# Patient Record
Sex: MALE | Race: WHITE | NOT HISPANIC OR LATINO | Employment: OTHER | URBAN - METROPOLITAN AREA
[De-identification: names, ages, dates, MRNs, and addresses within clinical notes are randomized per-mention and may not be internally consistent; named-entity substitution may affect disease eponyms.]

---

## 2018-05-19 ENCOUNTER — APPOINTMENT (EMERGENCY)
Dept: RADIOLOGY | Facility: HOSPITAL | Age: 68
End: 2018-05-19
Payer: COMMERCIAL

## 2018-05-19 ENCOUNTER — HOSPITAL ENCOUNTER (EMERGENCY)
Facility: HOSPITAL | Age: 68
Discharge: HOME | End: 2018-05-19
Attending: EMERGENCY MEDICINE | Admitting: EMERGENCY MEDICINE
Payer: COMMERCIAL

## 2018-05-19 VITALS
HEART RATE: 70 BPM | TEMPERATURE: 98.3 F | OXYGEN SATURATION: 95 % | DIASTOLIC BLOOD PRESSURE: 65 MMHG | WEIGHT: 237.66 LBS | SYSTOLIC BLOOD PRESSURE: 135 MMHG | RESPIRATION RATE: 24 BRPM

## 2018-05-19 DIAGNOSIS — J06.9 UPPER RESPIRATORY INFECTION: Primary | ICD-10-CM

## 2018-05-19 LAB
ALBUMIN SERPL BCP-MCNC: 3.6 G/DL (ref 3.5–5)
ALP SERPL-CCNC: 75 U/L (ref 46–116)
ALT SERPL W P-5'-P-CCNC: 33 U/L (ref 12–78)
ANION GAP SERPL CALCULATED.3IONS-SCNC: 9 MMOL/L (ref 4–13)
APTT PPP: 26 SECONDS (ref 24–36)
AST SERPL W P-5'-P-CCNC: 19 U/L (ref 5–45)
BASOPHILS # BLD AUTO: 0.02 THOUSANDS/ΜL (ref 0–0.1)
BASOPHILS NFR BLD AUTO: 0 % (ref 0–1)
BILIRUB SERPL-MCNC: 0.4 MG/DL (ref 0.2–1)
BUN SERPL-MCNC: 24 MG/DL (ref 5–25)
CALCIUM SERPL-MCNC: 8.6 MG/DL (ref 8.3–10.1)
CHLORIDE SERPL-SCNC: 106 MMOL/L (ref 100–108)
CO2 SERPL-SCNC: 27 MMOL/L (ref 21–32)
CREAT SERPL-MCNC: 1.17 MG/DL (ref 0.6–1.3)
EOSINOPHIL # BLD AUTO: 0.14 THOUSAND/ΜL (ref 0–0.61)
EOSINOPHIL NFR BLD AUTO: 2 % (ref 0–6)
ERYTHROCYTE [DISTWIDTH] IN BLOOD BY AUTOMATED COUNT: 14.1 % (ref 11.6–15.1)
GFR SERPL CREATININE-BSD FRML MDRD: 64 ML/MIN/1.73SQ M
GLUCOSE SERPL-MCNC: 112 MG/DL (ref 65–140)
HCT VFR BLD AUTO: 45.2 % (ref 36.5–49.3)
HGB BLD-MCNC: 14.7 G/DL (ref 12–17)
INR PPP: 0.95 (ref 0.86–1.17)
LYMPHOCYTES # BLD AUTO: 1.29 THOUSANDS/ΜL (ref 0.6–4.47)
LYMPHOCYTES NFR BLD AUTO: 14 % (ref 14–44)
MCH RBC QN AUTO: 29 PG (ref 26.8–34.3)
MCHC RBC AUTO-ENTMCNC: 32.5 G/DL (ref 31.4–37.4)
MCV RBC AUTO: 89 FL (ref 82–98)
MONOCYTES # BLD AUTO: 0.85 THOUSAND/ΜL (ref 0.17–1.22)
MONOCYTES NFR BLD AUTO: 9 % (ref 4–12)
NEUTROPHILS # BLD AUTO: 7.27 THOUSANDS/ΜL (ref 1.85–7.62)
NEUTS SEG NFR BLD AUTO: 76 % (ref 43–75)
PLATELET # BLD AUTO: 213 THOUSANDS/UL (ref 149–390)
PMV BLD AUTO: 10.6 FL (ref 8.9–12.7)
POTASSIUM SERPL-SCNC: 4.7 MMOL/L (ref 3.5–5.3)
PROT SERPL-MCNC: 7.5 G/DL (ref 6.4–8.2)
PROTHROMBIN TIME: 12.4 SECONDS (ref 11.8–14.2)
RBC # BLD AUTO: 5.07 MILLION/UL (ref 3.88–5.62)
SODIUM SERPL-SCNC: 142 MMOL/L (ref 136–145)
TROPONIN I SERPL-MCNC: <0.02 NG/ML
WBC # BLD AUTO: 9.57 THOUSAND/UL (ref 4.31–10.16)

## 2018-05-19 PROCEDURE — 93005 ELECTROCARDIOGRAM TRACING: CPT

## 2018-05-19 PROCEDURE — 36415 COLL VENOUS BLD VENIPUNCTURE: CPT | Performed by: PHYSICIAN ASSISTANT

## 2018-05-19 PROCEDURE — 84484 ASSAY OF TROPONIN QUANT: CPT | Performed by: PHYSICIAN ASSISTANT

## 2018-05-19 PROCEDURE — 80053 COMPREHEN METABOLIC PANEL: CPT | Performed by: PHYSICIAN ASSISTANT

## 2018-05-19 PROCEDURE — 85025 COMPLETE CBC W/AUTO DIFF WBC: CPT | Performed by: PHYSICIAN ASSISTANT

## 2018-05-19 PROCEDURE — 87040 BLOOD CULTURE FOR BACTERIA: CPT | Performed by: PHYSICIAN ASSISTANT

## 2018-05-19 PROCEDURE — 99285 EMERGENCY DEPT VISIT HI MDM: CPT

## 2018-05-19 PROCEDURE — 85730 THROMBOPLASTIN TIME PARTIAL: CPT | Performed by: PHYSICIAN ASSISTANT

## 2018-05-19 PROCEDURE — 96360 HYDRATION IV INFUSION INIT: CPT

## 2018-05-19 PROCEDURE — 94640 AIRWAY INHALATION TREATMENT: CPT

## 2018-05-19 PROCEDURE — 71046 X-RAY EXAM CHEST 2 VIEWS: CPT

## 2018-05-19 PROCEDURE — 85610 PROTHROMBIN TIME: CPT | Performed by: PHYSICIAN ASSISTANT

## 2018-05-19 PROCEDURE — 96361 HYDRATE IV INFUSION ADD-ON: CPT

## 2018-05-19 RX ORDER — DIAZEPAM 5 MG/1
5 TABLET ORAL ONCE
Status: COMPLETED | OUTPATIENT
Start: 2018-05-19 | End: 2018-05-19

## 2018-05-19 RX ORDER — ALBUTEROL SULFATE 2.5 MG/3ML
2.5 SOLUTION RESPIRATORY (INHALATION) ONCE
Status: COMPLETED | OUTPATIENT
Start: 2018-05-19 | End: 2018-05-19

## 2018-05-19 RX ORDER — AZITHROMYCIN 250 MG/1
250 TABLET, FILM COATED ORAL DAILY
Qty: 4 TABLET | Refills: 0 | Status: SHIPPED | OUTPATIENT
Start: 2018-05-19 | End: 2018-05-23

## 2018-05-19 RX ORDER — AZITHROMYCIN 250 MG/1
500 TABLET, FILM COATED ORAL ONCE
Status: COMPLETED | OUTPATIENT
Start: 2018-05-19 | End: 2018-05-19

## 2018-05-19 RX ORDER — BENZONATATE 100 MG/1
100 CAPSULE ORAL 3 TIMES DAILY PRN
Qty: 20 CAPSULE | Refills: 0 | Status: SHIPPED | OUTPATIENT
Start: 2018-05-19 | End: 2018-05-26

## 2018-05-19 RX ADMIN — ALBUTEROL SULFATE 2.5 MG: 2.5 SOLUTION RESPIRATORY (INHALATION) at 11:34

## 2018-05-19 RX ADMIN — AZITHROMYCIN 500 MG: 250 TABLET, FILM COATED ORAL at 13:37

## 2018-05-19 RX ADMIN — SODIUM CHLORIDE 1000 ML: 0.9 INJECTION, SOLUTION INTRAVENOUS at 11:21

## 2018-05-19 RX ADMIN — DIAZEPAM 5 MG: 5 TABLET ORAL at 13:37

## 2018-05-19 RX ADMIN — IPRATROPIUM BROMIDE 0.5 MG: 0.5 SOLUTION RESPIRATORY (INHALATION) at 11:34

## 2018-05-19 NOTE — ED NOTES
Patient remains on continuous cardiac and oxygenation monitoring       Esteban Patel RN  05/19/18 6674

## 2018-05-19 NOTE — ED NOTES
Patient states he is anxious about being here today  Visitor at bedside admits patient is nervous about everything since the death of his wife and takes medication daily for anxiety  Patient and visitor are tearful while explaining  Patient encouraged to slow breathing and calm himself        Prakash Brand RN  05/19/18 7381

## 2018-05-19 NOTE — DISCHARGE INSTRUCTIONS
Acute Bronchitis   WHAT YOU NEED TO KNOW:   Acute bronchitis is swelling and irritation in the air passages of your lungs  This irritation may cause you to cough or have other breathing problems  Acute bronchitis often starts because of another illness, such as a cold or the flu  The illness spreads from your nose and throat to your windpipe and airways  Bronchitis is often called a chest cold  Acute bronchitis lasts about 3 to 6 weeks and is usually not a serious illness  Your cough can last for several weeks  DISCHARGE INSTRUCTIONS:   Return to the emergency department if:   · You cough up blood  · Your lips or fingernails turn blue  · You feel like you are not getting enough air when you breathe  Contact your healthcare provider if:   · You have a fever  · Your breathing problems do not go away or get worse  · Your cough does not get better within 4 weeks  · You have questions or concerns about your condition or care  Self-care:   · Get more rest   Rest helps your body to heal  Slowly start to do more each day  Rest when you feel it is needed  · Avoid irritants in the air  Avoid chemicals, fumes, and dust  Wear a face mask if you must work around dust or fumes  Stay inside on days when air pollution levels are high  If you have allergies, stay inside when pollen counts are high  Do not use aerosol products, such as spray-on deodorant, bug spray, and hair spray  · Do not smoke or be around others who smoke  Nicotine and other chemicals in cigarettes and cigars damages the cilia that move mucus out of your lungs  Ask your healthcare provider for information if you currently smoke and need help to quit  E-cigarettes or smokeless tobacco still contain nicotine  Talk to your healthcare provider before you use these products  · Drink liquids as directed  Liquids help keep your air passages moist and help you cough up mucus   You may need to drink more liquids when you have acute bronchitis  Ask how much liquid to drink each day and which liquids are best for you  · Use a humidifier or vaporizer  Use a cool mist humidifier or a vaporizer to increase air moisture in your home  This may make it easier for you to breathe and help decrease your cough  Decrease risk for acute bronchitis:   · Get the vaccinations you need  Ask your healthcare provider if you should get vaccinated against the flu or pneumonia  · Prevent the spread of germs  You can decrease your risk of acute bronchitis and other illnesses by doing the following:     [facility] your hands often with soap and water  Carry germ-killing hand lotion or gel with you  You can use the lotion or gel to clean your hands when soap and water are not available  ¨ Do not touch your eyes, nose, or mouth unless you have washed your hands first     ¨ Always cover your mouth when you cough to prevent the spread of germs  It is best to cough into a tissue or your shirt sleeve instead of into your hand  Ask those around you cover their mouths when they cough  ¨ Try to avoid people who have a cold or the flu  If you are sick, stay away from others as much as possible  Medicines: Your healthcare provider may  give you any of the following:  · Ibuprofen or acetaminophen  are medicines that help lower your fever  They are available without a doctor's order  Ask your healthcare provider which medicine is right for you  Ask how much to take and how often to take it  Follow directions  These medicines can cause stomach bleeding if not taken correctly  Ibuprofen can cause kidney damage  Do not take ibuprofen if you have kidney disease, an ulcer, or allergies to aspirin  Acetaminophen can cause liver damage  Do not take more than 4,000 milligrams in 24 hours  · Decongestants  help loosen mucus in your lungs and make it easier to cough up  This can help you breathe easier  · Cough suppressants  decrease your urge to cough   If your cough produces mucus, do not take a cough suppressant unless your healthcare provider tells you to  Your healthcare provider may suggest that you take a cough suppressant at night so you can rest     · Inhalers  may be given  Your healthcare provider may give you one or more inhalers to help you breathe easier and cough less  An inhaler gives your medicine to open your airways  Ask your healthcare provider to show you how to use your inhaler correctly  · Take your medicine as directed  Contact your healthcare provider if you think your medicine is not helping or if you have side effects  Tell him of her if you are allergic to any medicine  Keep a list of the medicines, vitamins, and herbs you take  Include the amounts, and when and why you take them  Bring the list or the pill bottles to follow-up visits  Carry your medicine list with you in case of an emergency  Follow up with your healthcare provider as directed:  Write down questions you have so you will remember to ask them during your follow-up visits  © 2017 [employer] Information is for End User's use only and may not be sold, redistributed or otherwise used for commercial purposes  All illustrations and images included in CareNotes® are the copyrighted property of [employer]  or Dalton Maldonado  The above information is an  only  It is not intended as medical advice for individual conditions or treatments  Talk to your doctor, nurse or pharmacist before following any medical regimen to see if it is safe and effective for you

## 2018-05-19 NOTE — ED PROVIDER NOTES
History  Chief Complaint   Patient presents with    Irregular Heart Beat     Referred by Urgent Care for abnormal EKG  Went to urgent care due to "feeling unwell", dx viral URI  Denies SOB, nausea  Admits chest tightness, anxious since rushed to ED  56-year-old male presents to the emergency department with complaints of upper respiratory symptoms  States that he has had a cough and feeling generally unwell over the past few days  States that he has not had any fevers at home  No shortness of breath or chest pain  Denies nausea or vomiting  States that he went to see a provider [facility] earlier today and had EKG done which was abnormal   Notes that since that time he was rushed to the emergency department after being diagnosed with a viral upper respiratory infection and has developed some anxiety along with chest tightness  History provided by:  Patient   used: No        None       Past Medical History:   Diagnosis Date    Anxiety     Depression     Hyperlipidemia     Hypertension        Past Surgical History:   Procedure Laterality Date    KNEE ARTHROSCOPY Left     SHOULDER ARTHROSCOPY Right        History reviewed  No pertinent family history  I have reviewed and agree with the history as documented  Social History   Substance Use Topics    Smoking status: Never Smoker    Smokeless tobacco: Never Used    Alcohol use No        Review of Systems   Constitutional: Negative for activity change, appetite change, chills and fever  HENT: Positive for congestion  Negative for dental problem, drooling, ear discharge, ear pain, mouth sores, nosebleeds, rhinorrhea, sore throat and trouble swallowing  Eyes: Negative for pain, discharge and itching  Respiratory: Positive for cough and shortness of breath  Negative for chest tightness and wheezing  Cardiovascular: Negative for chest pain and palpitations     Gastrointestinal: Negative for abdominal pain, blood in stool, constipation, diarrhea, nausea and vomiting  Endocrine: Negative for cold intolerance and heat intolerance  Genitourinary: Negative for difficulty urinating, dysuria, flank pain, frequency and urgency  Skin: Negative for rash and wound  Allergic/Immunologic: Negative for food allergies and immunocompromised state  Neurological: Negative for dizziness, seizures, syncope, weakness, numbness and headaches  Psychiatric/Behavioral: Negative for agitation, behavioral problems and confusion  Physical Exam  Physical Exam   Constitutional: He is oriented to person, place, and time  He appears well-developed and well-nourished  No distress  HENT:   Head: Normocephalic and atraumatic  Right Ear: External ear normal    Left Ear: External ear normal    Mouth/Throat: Oropharynx is clear and moist  No oropharyngeal exudate  Eyes: Conjunctivae are normal    Neck: No JVD present  No tracheal deviation present  Cardiovascular: Normal rate, regular rhythm and normal heart sounds  Exam reveals no gallop and no friction rub  No murmur heard  Pulmonary/Chest: Effort normal  No respiratory distress  He has wheezes (scattered)  He has no rales  He exhibits no tenderness  Abdominal: Soft  Bowel sounds are normal  He exhibits no distension  There is no tenderness  There is no guarding  Musculoskeletal: Normal range of motion  He exhibits no edema, tenderness or deformity  Lymphadenopathy:     He has no cervical adenopathy  Neurological: He is alert and oriented to person, place, and time  Skin: Skin is warm and dry  No rash noted  He is not diaphoretic  No erythema  Psychiatric: He has a normal mood and affect  His behavior is normal    Nursing note and vitals reviewed        Vital Signs  ED Triage Vitals [05/19/18 1103]   Temperature Pulse Respirations Blood Pressure SpO2   98 3 °F (36 8 °C) 66 (!) 40 134/91 95 %      Temp Source Heart Rate Source Patient Position - Orthostatic VS BP Location FiO2 (%)   Oral Monitor Sitting Right arm --      Pain Score       --           Vitals:    05/19/18 1103   BP: 134/91   Pulse: 66   Patient Position - Orthostatic VS: Sitting       Visual Acuity      ED Medications  Medications   azithromycin (ZITHROMAX) tablet 500 mg (not administered)   diazepam (VALIUM) tablet 5 mg (not administered)   sodium chloride 0 9 % bolus 1,000 mL (1,000 mL Intravenous New Bag 5/19/18 1121)   albuterol inhalation solution 2 5 mg (2 5 mg Nebulization Given 5/19/18 1134)   ipratropium (ATROVENT) 0 02 % inhalation solution 0 5 mg (0 5 mg Nebulization Given 5/19/18 1134)       Diagnostic Studies  Results Reviewed     Procedure Component Value Units Date/Time    Troponin I [[id number]]  (Normal) Collected:  05/19/18 1121    Lab Status:  Final result Specimen:  Blood from Arm, Right Updated:  05/19/18 1147     Troponin I <0 02 ng/mL     Narrative:         [employer] Chemistry analyzer 99% cutoff is > 0 04 ng/mL in network labs    o cTnI 99% cutoff is useful only when applied to patients in the clinical setting of myocardial ischemia  o cTnI 99% cutoff should be interpreted in the context of clinical history, ECG findings and possibly cardiac imaging to establish correct diagnosis  o cTnI 99% cutoff may be suggestive but clearly not indicative of a coronary event without the clinical setting of myocardial ischemia      Comprehensive metabolic panel [[id number]] Collected:  05/19/18 1121    Lab Status:  Final result Specimen:  Blood from Arm, Right Updated:  05/19/18 1146     Sodium 142 mmol/L      Potassium 4 7 mmol/L      Chloride 106 mmol/L      CO2 27 mmol/L      Anion Gap 9 mmol/L      BUN 24 mg/dL      Creatinine 1 17 mg/dL      Glucose 112 mg/dL      Calcium 8 6 mg/dL      AST 19 U/L      ALT 33 U/L      Alkaline Phosphatase 75 U/L      Total Protein 7 5 g/dL      Albumin 3 6 g/dL      Total Bilirubin 0 40 mg/dL      eGFR 64 ml/min/1 73sq m     Narrative:         National Kidney Disease Education Program recommendations are as follows:  GFR calculation is accurate only with a steady state creatinine  Chronic Kidney disease less than 60 ml/min/1 73 sq  meters  Kidney failure less than 15 ml/min/1 73 sq  meters  Protime-INR [[id number]]  (Normal) Collected:  05/19/18 1121    Lab Status:  Final result Specimen:  Blood from Arm, Right Updated:  05/19/18 1140     Protime 12 4 seconds      INR 0 95    APTT [[id number]]  (Normal) Collected:  05/19/18 1121    Lab Status:  Final result Specimen:  Blood from Arm, Right Updated:  05/19/18 1140     PTT 26 seconds     CBC and differential [[id number]]  (Abnormal) Collected:  05/19/18 1121    Lab Status:  Final result Specimen:  Blood from Arm, Right Updated:  05/19/18 1130     WBC 9 57 Thousand/uL      RBC 5 07 Million/uL      Hemoglobin 14 7 g/dL      Hematocrit 45 2 %      MCV 89 fL      MCH 29 0 pg      MCHC 32 5 g/dL      RDW 14 1 %      MPV 10 6 fL      Platelets 454 Thousands/uL      Neutrophils Relative 76 (H) %      Lymphocytes Relative 14 %      Monocytes Relative 9 %      Eosinophils Relative 2 %      Basophils Relative 0 %      Neutrophils Absolute 7 27 Thousands/µL      Lymphocytes Absolute 1 29 Thousands/µL      Monocytes Absolute 0 85 Thousand/µL      Eosinophils Absolute 0 14 Thousand/µL      Basophils Absolute 0 02 Thousands/µL     Blood culture #2 [[id number]] Collected:  05/19/18 1121    Lab Status: In process Specimen:  Blood from Arm, Right Updated:  05/19/18 1127    Blood culture #1 [[id number]] Collected:  05/19/18 1116    Lab Status: In process Specimen:  Blood from Arm, Left Updated:  05/19/18 1119                 XR chest 2 views   Final Result by Audra Almanzar MD (05/19 1135)      No acute cardiopulmonary disease              Workstation performed: [id number]                    Procedures  ECG 12 Lead Documentation  Date/Time: 5/19/2018 1:03 PM  Performed by: Lisset Rhodes  Authorized by: Italia Carbajal     Indications / Diagnosis:  Pain  ECG reviewed by me, the ED Provider: yes    Patient location:  ED  Previous ECG:     Previous ECG:  Unavailable  Interpretation:     Interpretation: abnormal    Rate:     ECG rate:  96    ECG rate assessment: tachycardic    Rhythm:     Rhythm: sinus tachycardia    Ectopy:     Ectopy: none    QRS:     QRS axis:  Normal    QRS intervals:  Normal  Conduction:     Conduction: abnormal (Sinus arrhythmia)    ST segments:     ST segments:  Normal  T waves:     T waves: normal             Phone Contacts  ED Phone Contact    ED Course                               MDM  Number of Diagnoses or Management Options  Diagnosis management comments: Differential diagnosis includes but not limited to:  Upper respiratory infection, bronchitis, pneumonia, acute coronary syndrome  Amount and/or Complexity of Data Reviewed  Clinical lab tests: ordered and reviewed  Tests in the radiology section of CPT®: ordered and reviewed  Independent visualization of images, tracings, or specimens: yes      CritCare Time    Disposition  Final diagnoses:   Upper respiratory infection     Time reflects when diagnosis was documented in both MDM as applicable and the Disposition within this note     Time User Action Codes Description Comment    5/19/2018 12:48 PM Aishwarya Harrell [J06 9] Upper respiratory infection       ED Disposition     ED Disposition Condition Comment    Discharge  Paty Flores discharge to home/self care      Condition at discharge: Stable        Follow-up Information     Follow up With Specialties Details Why [facility], DO Family Medicine   [address] [id number] [phone]            Patient's Medications   Discharge Prescriptions    AZITHROMYCIN (ZITHROMAX) 250 MG TABLET    Take 1 tablet (250 mg total) by mouth daily for 4 days       Start Date: 5/19/2018 End Date: 5/23/2018       Order Dose: 250 mg       Quantity: 4 tablet    Refills: 0    BENZONATATE (TESSALON PERLES) 100 MG CAPSULE    Take 1 capsule (100 mg total) by mouth 3 (three) times a day as needed for cough for up to 7 days       Start Date: 5/19/2018 End Date: 5/26/2018       Order Dose: 100 mg       Quantity: 20 capsule    Refills: 0     No discharge procedures on file      ED Provider  Electronically Signed by           Matt Denny PA-C  05/19/18 115 Tammy Basurto PA-C  05/19/18 2234

## 2018-05-21 LAB
ATRIAL RATE: 115 BPM
P AXIS: -77 DEGREES
PR INTERVAL: 160 MS
QRS AXIS: 9 DEGREES
QRSD INTERVAL: 92 MS
QT INTERVAL: 342 MS
QTC INTERVAL: 433 MS
T WAVE AXIS: 34 DEGREES
VENTRICULAR RATE: 96 BPM

## 2018-05-21 PROCEDURE — 93010 ELECTROCARDIOGRAM REPORT: CPT | Performed by: INTERNAL MEDICINE

## 2018-05-24 LAB
BACTERIA BLD CULT: NORMAL
BACTERIA BLD CULT: NORMAL

## 2019-08-17 ENCOUNTER — APPOINTMENT (EMERGENCY)
Dept: CT IMAGING | Facility: HOSPITAL | Age: 69
DRG: 694 | End: 2019-08-17
Payer: COMMERCIAL

## 2019-08-17 ENCOUNTER — HOSPITAL ENCOUNTER (INPATIENT)
Facility: HOSPITAL | Age: 69
LOS: 2 days | Discharge: HOME HEALTH | DRG: 694 | End: 2019-08-20
Attending: EMERGENCY MEDICINE | Admitting: INTERNAL MEDICINE
Payer: COMMERCIAL

## 2019-08-17 DIAGNOSIS — N20.1 URETEROLITHIASIS: ICD-10-CM

## 2019-08-17 DIAGNOSIS — N23 RENAL COLIC ON LEFT SIDE: Primary | ICD-10-CM

## 2019-08-17 DIAGNOSIS — N17.9 AKI (ACUTE KIDNEY INJURY) (HCC): ICD-10-CM

## 2019-08-17 PROBLEM — E78.5 HYPERLIPIDEMIA: Chronic | Status: ACTIVE | Noted: 2019-08-17

## 2019-08-17 PROBLEM — I48.0 PAROXYSMAL ATRIAL FIBRILLATION (HCC): Chronic | Status: ACTIVE | Noted: 2019-08-17

## 2019-08-17 PROBLEM — K21.9 GERD (GASTROESOPHAGEAL REFLUX DISEASE): Chronic | Status: ACTIVE | Noted: 2019-08-17

## 2019-08-17 PROBLEM — I10 ESSENTIAL HYPERTENSION: Chronic | Status: ACTIVE | Noted: 2019-08-17

## 2019-08-17 PROBLEM — F32.A DEPRESSION: Chronic | Status: ACTIVE | Noted: 2019-08-17

## 2019-08-17 LAB
ALBUMIN SERPL BCP-MCNC: 3.5 G/DL (ref 3.5–5)
ALP SERPL-CCNC: 70 U/L (ref 46–116)
ALT SERPL W P-5'-P-CCNC: 24 U/L (ref 12–78)
AMORPH URATE CRY URNS QL MICRO: ABNORMAL /HPF
ANION GAP SERPL CALCULATED.3IONS-SCNC: 10 MMOL/L (ref 4–13)
AST SERPL W P-5'-P-CCNC: 18 U/L (ref 5–45)
BACTERIA UR QL AUTO: ABNORMAL /HPF
BASOPHILS # BLD AUTO: 0.04 THOUSANDS/ΜL (ref 0–0.1)
BASOPHILS NFR BLD AUTO: 0 % (ref 0–1)
BILIRUB SERPL-MCNC: 0.4 MG/DL (ref 0.2–1)
BILIRUB UR QL STRIP: NEGATIVE
BUN SERPL-MCNC: 26 MG/DL (ref 5–25)
CALCIUM SERPL-MCNC: 9.1 MG/DL (ref 8.3–10.1)
CHLORIDE SERPL-SCNC: 102 MMOL/L (ref 100–108)
CK SERPL-CCNC: 75 U/L (ref 39–308)
CLARITY UR: CLEAR
CO2 SERPL-SCNC: 27 MMOL/L (ref 21–32)
COLOR UR: YELLOW
CREAT SERPL-MCNC: 1.45 MG/DL (ref 0.6–1.3)
EOSINOPHIL # BLD AUTO: 0.19 THOUSAND/ΜL (ref 0–0.61)
EOSINOPHIL NFR BLD AUTO: 2 % (ref 0–6)
ERYTHROCYTE [DISTWIDTH] IN BLOOD BY AUTOMATED COUNT: 13.7 % (ref 11.6–15.1)
GFR SERPL CREATININE-BSD FRML MDRD: 49 ML/MIN/1.73SQ M
GLUCOSE SERPL-MCNC: 118 MG/DL (ref 65–140)
GLUCOSE UR STRIP-MCNC: NEGATIVE MG/DL
HCT VFR BLD AUTO: 45.2 % (ref 36.5–49.3)
HGB BLD-MCNC: 14.7 G/DL (ref 12–17)
HGB UR QL STRIP.AUTO: ABNORMAL
IMM GRANULOCYTES # BLD AUTO: 0.12 THOUSAND/UL (ref 0–0.2)
IMM GRANULOCYTES NFR BLD AUTO: 1 % (ref 0–2)
KETONES UR STRIP-MCNC: ABNORMAL MG/DL
LEUKOCYTE ESTERASE UR QL STRIP: NEGATIVE
LIPASE SERPL-CCNC: 105 U/L (ref 73–393)
LYMPHOCYTES # BLD AUTO: 1.74 THOUSANDS/ΜL (ref 0.6–4.47)
LYMPHOCYTES NFR BLD AUTO: 14 % (ref 14–44)
MCH RBC QN AUTO: 29.6 PG (ref 26.8–34.3)
MCHC RBC AUTO-ENTMCNC: 32.5 G/DL (ref 31.4–37.4)
MCV RBC AUTO: 91 FL (ref 82–98)
MONOCYTES # BLD AUTO: 1.27 THOUSAND/ΜL (ref 0.17–1.22)
MONOCYTES NFR BLD AUTO: 10 % (ref 4–12)
NEUTROPHILS # BLD AUTO: 9.45 THOUSANDS/ΜL (ref 1.85–7.62)
NEUTS SEG NFR BLD AUTO: 73 % (ref 43–75)
NITRITE UR QL STRIP: NEGATIVE
NON-SQ EPI CELLS URNS QL MICRO: ABNORMAL /HPF
NRBC BLD AUTO-RTO: 0 /100 WBCS
PH UR STRIP.AUTO: 5.5 [PH] (ref 4.5–8)
PLATELET # BLD AUTO: 257 THOUSANDS/UL (ref 149–390)
PMV BLD AUTO: 9.9 FL (ref 8.9–12.7)
POTASSIUM SERPL-SCNC: 4.5 MMOL/L (ref 3.5–5.3)
PROT SERPL-MCNC: 7.2 G/DL (ref 6.4–8.2)
PROT UR STRIP-MCNC: ABNORMAL MG/DL
RBC # BLD AUTO: 4.97 MILLION/UL (ref 3.88–5.62)
RBC #/AREA URNS AUTO: ABNORMAL /HPF
SODIUM SERPL-SCNC: 139 MMOL/L (ref 136–145)
SP GR UR STRIP.AUTO: >=1.03 (ref 1–1.03)
UROBILINOGEN UR QL STRIP.AUTO: 0.2 E.U./DL
WBC # BLD AUTO: 12.81 THOUSAND/UL (ref 4.31–10.16)
WBC #/AREA URNS AUTO: ABNORMAL /HPF

## 2019-08-17 PROCEDURE — 36415 COLL VENOUS BLD VENIPUNCTURE: CPT | Performed by: EMERGENCY MEDICINE

## 2019-08-17 PROCEDURE — 81001 URINALYSIS AUTO W/SCOPE: CPT

## 2019-08-17 PROCEDURE — 96361 HYDRATE IV INFUSION ADD-ON: CPT

## 2019-08-17 PROCEDURE — 87086 URINE CULTURE/COLONY COUNT: CPT | Performed by: EMERGENCY MEDICINE

## 2019-08-17 PROCEDURE — 96374 THER/PROPH/DIAG INJ IV PUSH: CPT

## 2019-08-17 PROCEDURE — 99220 PR INITIAL OBSERVATION CARE/DAY 70 MINUTES: CPT | Performed by: INTERNAL MEDICINE

## 2019-08-17 PROCEDURE — 85025 COMPLETE CBC W/AUTO DIFF WBC: CPT | Performed by: EMERGENCY MEDICINE

## 2019-08-17 PROCEDURE — 99285 EMERGENCY DEPT VISIT HI MDM: CPT | Performed by: EMERGENCY MEDICINE

## 2019-08-17 PROCEDURE — 80053 COMPREHEN METABOLIC PANEL: CPT | Performed by: EMERGENCY MEDICINE

## 2019-08-17 PROCEDURE — 87081 CULTURE SCREEN ONLY: CPT | Performed by: PHYSICIAN ASSISTANT

## 2019-08-17 PROCEDURE — 83690 ASSAY OF LIPASE: CPT | Performed by: EMERGENCY MEDICINE

## 2019-08-17 PROCEDURE — 74176 CT ABD & PELVIS W/O CONTRAST: CPT

## 2019-08-17 PROCEDURE — 99285 EMERGENCY DEPT VISIT HI MDM: CPT

## 2019-08-17 PROCEDURE — 82550 ASSAY OF CK (CPK): CPT | Performed by: EMERGENCY MEDICINE

## 2019-08-17 RX ORDER — TAMSULOSIN HYDROCHLORIDE 0.4 MG/1
0.4 CAPSULE ORAL
Status: DISCONTINUED | OUTPATIENT
Start: 2019-08-17 | End: 2019-08-19

## 2019-08-17 RX ORDER — HYDROMORPHONE HCL/PF 1 MG/ML
0.5 SYRINGE (ML) INJECTION EVERY 4 HOURS PRN
Status: DISCONTINUED | OUTPATIENT
Start: 2019-08-17 | End: 2019-08-20 | Stop reason: HOSPADM

## 2019-08-17 RX ORDER — METOPROLOL SUCCINATE 25 MG/1
25 TABLET, EXTENDED RELEASE ORAL
Status: DISCONTINUED | OUTPATIENT
Start: 2019-08-17 | End: 2019-08-20 | Stop reason: HOSPADM

## 2019-08-17 RX ORDER — ACETAMINOPHEN 500 MG
1000 TABLET ORAL EVERY 6 HOURS PRN
COMMUNITY

## 2019-08-17 RX ORDER — HYDROMORPHONE HCL/PF 1 MG/ML
0.2 SYRINGE (ML) INJECTION EVERY 4 HOURS PRN
Status: DISCONTINUED | OUTPATIENT
Start: 2019-08-17 | End: 2019-08-20 | Stop reason: HOSPADM

## 2019-08-17 RX ORDER — FAMOTIDINE 20 MG/1
20 TABLET, FILM COATED ORAL 2 TIMES DAILY
COMMUNITY
End: 2021-01-13 | Stop reason: SDUPTHER

## 2019-08-17 RX ORDER — SODIUM CHLORIDE 9 MG/ML
75 INJECTION, SOLUTION INTRAVENOUS CONTINUOUS
Status: DISCONTINUED | OUTPATIENT
Start: 2019-08-17 | End: 2019-08-20

## 2019-08-17 RX ORDER — MELATONIN
2000 DAILY
Status: DISCONTINUED | OUTPATIENT
Start: 2019-08-17 | End: 2019-08-20 | Stop reason: HOSPADM

## 2019-08-17 RX ORDER — FLUOXETINE HYDROCHLORIDE 20 MG/1
40 CAPSULE ORAL DAILY
Status: DISCONTINUED | OUTPATIENT
Start: 2019-08-17 | End: 2019-08-20 | Stop reason: HOSPADM

## 2019-08-17 RX ORDER — AMLODIPINE BESYLATE 5 MG/1
5 TABLET ORAL DAILY
COMMUNITY
Start: 2016-12-29 | End: 2020-07-25

## 2019-08-17 RX ORDER — PRAVASTATIN SODIUM 40 MG
40 TABLET ORAL
Status: DISCONTINUED | OUTPATIENT
Start: 2019-08-17 | End: 2019-08-20 | Stop reason: HOSPADM

## 2019-08-17 RX ORDER — FAMOTIDINE 20 MG/1
10 TABLET, FILM COATED ORAL DAILY
Status: DISCONTINUED | OUTPATIENT
Start: 2019-08-17 | End: 2019-08-20 | Stop reason: HOSPADM

## 2019-08-17 RX ORDER — HYDRALAZINE HYDROCHLORIDE 20 MG/ML
5 INJECTION INTRAMUSCULAR; INTRAVENOUS EVERY 6 HOURS PRN
Status: DISCONTINUED | OUTPATIENT
Start: 2019-08-17 | End: 2019-08-20 | Stop reason: HOSPADM

## 2019-08-17 RX ORDER — PRAVASTATIN SODIUM 20 MG
40 TABLET ORAL DAILY
COMMUNITY
End: 2020-06-17 | Stop reason: SDUPTHER

## 2019-08-17 RX ORDER — DABIGATRAN ETEXILATE 150 MG/1
150 CAPSULE, COATED PELLETS ORAL 2 TIMES DAILY
COMMUNITY
End: 2020-06-17 | Stop reason: ALTCHOICE

## 2019-08-17 RX ORDER — ONDANSETRON 2 MG/ML
4 INJECTION INTRAMUSCULAR; INTRAVENOUS EVERY 6 HOURS PRN
Status: DISCONTINUED | OUTPATIENT
Start: 2019-08-17 | End: 2019-08-20 | Stop reason: HOSPADM

## 2019-08-17 RX ORDER — KETOROLAC TROMETHAMINE 30 MG/ML
15 INJECTION, SOLUTION INTRAMUSCULAR; INTRAVENOUS ONCE
Status: COMPLETED | OUTPATIENT
Start: 2019-08-17 | End: 2019-08-17

## 2019-08-17 RX ORDER — ACETAMINOPHEN 325 MG/1
650 TABLET ORAL EVERY 6 HOURS PRN
Status: DISCONTINUED | OUTPATIENT
Start: 2019-08-17 | End: 2019-08-17

## 2019-08-17 RX ORDER — METOPROLOL SUCCINATE 25 MG/1
25 TABLET, EXTENDED RELEASE ORAL DAILY
COMMUNITY
End: 2020-08-10 | Stop reason: SDUPTHER

## 2019-08-17 RX ORDER — TAMSULOSIN HYDROCHLORIDE 0.4 MG/1
0.4 CAPSULE ORAL ONCE
Status: COMPLETED | OUTPATIENT
Start: 2019-08-17 | End: 2019-08-17

## 2019-08-17 RX ORDER — DABIGATRAN ETEXILATE 150 MG/1
150 CAPSULE, COATED PELLETS ORAL 2 TIMES DAILY
Status: DISCONTINUED | OUTPATIENT
Start: 2019-08-17 | End: 2019-08-20 | Stop reason: HOSPADM

## 2019-08-17 RX ORDER — ACETAMINOPHEN 325 MG/1
650 TABLET ORAL EVERY 6 HOURS PRN
Status: DISCONTINUED | OUTPATIENT
Start: 2019-08-17 | End: 2019-08-20 | Stop reason: HOSPADM

## 2019-08-17 RX ORDER — FLUOXETINE HYDROCHLORIDE 40 MG/1
40 CAPSULE ORAL DAILY
COMMUNITY
Start: 2016-12-29 | End: 2020-08-10 | Stop reason: SDUPTHER

## 2019-08-17 RX ADMIN — KETOROLAC TROMETHAMINE 15 MG: 30 INJECTION, SOLUTION INTRAMUSCULAR at 03:59

## 2019-08-17 RX ADMIN — DABIGATRAN ETEXILATE MESYLATE 150 MG: 150 CAPSULE ORAL at 17:01

## 2019-08-17 RX ADMIN — PRAVASTATIN SODIUM 40 MG: 40 TABLET ORAL at 17:01

## 2019-08-17 RX ADMIN — SODIUM CHLORIDE 1000 ML: 0.9 INJECTION, SOLUTION INTRAVENOUS at 03:59

## 2019-08-17 RX ADMIN — DABIGATRAN ETEXILATE MESYLATE 150 MG: 150 CAPSULE ORAL at 08:06

## 2019-08-17 RX ADMIN — VITAMIN D, TAB 1000IU (100/BT) 2000 UNITS: 25 TAB at 08:06

## 2019-08-17 RX ADMIN — FAMOTIDINE 10 MG: 20 TABLET ORAL at 08:07

## 2019-08-17 RX ADMIN — METOPROLOL SUCCINATE 25 MG: 25 TABLET, EXTENDED RELEASE ORAL at 17:01

## 2019-08-17 RX ADMIN — TAMSULOSIN HYDROCHLORIDE 0.4 MG: 0.4 CAPSULE ORAL at 17:01

## 2019-08-17 RX ADMIN — ACETAMINOPHEN 650 MG: 325 TABLET, FILM COATED ORAL at 08:54

## 2019-08-17 RX ADMIN — FLUOXETINE HYDROCHLORIDE 40 MG: 20 CAPSULE ORAL at 08:06

## 2019-08-17 RX ADMIN — TAMSULOSIN HYDROCHLORIDE 0.4 MG: 0.4 CAPSULE ORAL at 05:31

## 2019-08-17 RX ADMIN — SODIUM CHLORIDE 125 ML/HR: 0.9 INJECTION, SOLUTION INTRAVENOUS at 05:34

## 2019-08-17 NOTE — UTILIZATION REVIEW
Initial Clinical Review    Admission: Date/Time/Statement: 8/17/2019  0539 OBSERVATION     Orders Placed This Encounter   Procedures    Place in Observation     Standing Status:   Standing     Number of Occurrences:   1     Order Specific Question:   Admitting Physician     Answer:   Maite Loza [[id number]]     Order Specific Question:   Level of Care     Answer:   Med Surg [[id number]]     ED Arrival Information     Expected Arrival Acuity Means of Arrival Escorted By Service Admission Type    - 8/17/2019 03:33 Urgent Ambulance [facility] Hospitalist Urgent    Arrival Complaint    Abdominal pain        Chief Complaint   Patient presents with    Flank Pain     PT  complains of sudden onset of left sided flank pain that started in the back and radiated forward, denies any urinary complaints  Assessment/Plan:  this is a 77year old male from home to ED via EMS admitted to observation due to ureterolithiasis/RICHIE  Presented with sudden left flank pain since midnight  On exam has L CVAT  Bun 26, creatinine 1 45 with baseline of 1  Has leukocytosis of 12 81  Ct abdomen showed moderate hydroureteronephrosis up to level of a 2 mm calculus in distal ureter  Urine culture sent     Today will try medical expulsive therapy and if stone not passed, will consider urology consult     IVF, pain control, flomax  in progress    ED Triage Vitals [08/17/19 0336]   Temperature Pulse Respirations Blood Pressure SpO2   97 8 °F (36 6 °C) 78 18 (!) 199/92 95 %      Temp Source Heart Rate Source Patient Position - Orthostatic VS BP Location FiO2 (%)   Oral Monitor Sitting Right arm --      Pain Score       Worst Possible Pain        Wt Readings from Last 1 Encounters:   08/17/19 96 9 kg (213 lb 10 oz)     Additional Vital Signs:   08/17/19 0600  98 5 °F (36 9 °C)  70  18  158/76  94 %  None (Room air)  Lying   08/17/19 0535    66  18  179/93Abnormal   94 %  None (Room air)         Pertinent Labs/Diagnostic Test Results:   8/17/2019  Ct renal stone study - Moderately obstructing 2 mm distal left ureteric calculus, above the ureterovesical junction    2   Moderate-sized hiatal hernia  Results from last 7 days   Lab Units 08/17/19  0350   WBC Thousand/uL 12 81*   HEMOGLOBIN g/dL 14 7   HEMATOCRIT % 45 2   PLATELETS Thousands/uL 257   NEUTROS ABS Thousands/µL 9 45*         Results from last 7 days   Lab Units 08/17/19  0350   SODIUM mmol/L 139   POTASSIUM mmol/L 4 5   CHLORIDE mmol/L 102   CO2 mmol/L 27   ANION GAP mmol/L 10   BUN mg/dL 26*   CREATININE mg/dL 1 45*   EGFR ml/min/1 73sq m 49   CALCIUM mg/dL 9 1     Results from last 7 days   Lab Units 08/17/19  0350   AST U/L 18   ALT U/L 24   ALK PHOS U/L 70   TOTAL PROTEIN g/dL 7 2   ALBUMIN g/dL 3 5   TOTAL BILIRUBIN mg/dL 0 40     Results from last 7 days   Lab Units 08/17/19  0350   GLUCOSE RANDOM mg/dL 118     Results from last 7 days   Lab Units 08/17/19  0350   CK TOTAL U/L 75     Results from last 7 days   Lab Units 08/17/19  0350   LIPASE u/L 105     Results from last 7 days   Lab Units 08/17/19  0406   CLARITY UA  Clear   COLOR UA  Yellow   SPEC GRAV UA  >=1 030   PH UA  5 5   GLUCOSE UA mg/dl Negative   KETONES UA mg/dl Trace*   BLOOD UA  Large*   PROTEIN UA mg/dl 100 (2+)*   NITRITE UA  Negative   BILIRUBIN UA  Negative   UROBILINOGEN UA E U /dl 0 2   LEUKOCYTES UA  Negative   WBC UA /hpf 0-1*   RBC UA /hpf Innumerable*   BACTERIA UA /hpf Moderate*   EPITHELIAL CELLS WET PREP /hpf None Seen     ED Treatment:   Medication Administration from 08/17/2019 0333 to 08/17/2019 0550       Date/Time Order Dose Route Action Comments     08/17/2019 0359 sodium chloride 0 9 % bolus 1,000 mL 1,000 mL Intravenous New Bag      08/17/2019 0534 sodium chloride 0 9 % infusion 125 mL/hr Intravenous New Bag      08/17/2019 0359 ketorolac (TORADOL) injection 15 mg 15 mg Intravenous Given      08/17/2019 0531 tamsulosin (FLOMAX) capsule 0 4 mg 0 4 mg Oral Given         Past Medical History:   Diagnosis Date    Anxiety     Depression     Hyperlipidemia     Hypertension      Present on Admission:   Ureterolithiasis   Hyperlipidemia   Essential hypertension   GERD (gastroesophageal reflux disease)   [facility]   Depression   Paroxysmal atrial fibrillation (HCC)      Admitting Diagnosis: Abdominal pain [H49 6]  Renal colic on left side [F53]  [facility]IE [facility]) ([facility] ) [N17 9]  Age/Sex: 77 y o  male  Admission Orders:  8/17/2019  0539 OBSERVATION     Current Facility-Administered Medications:  Acetaminophen - used x 1 650 mg Oral Q6H PRN    cholecalciferol 2,000 Units Oral Daily    dabigatran etexilate 150 mg Oral BID    famotidine 10 mg Oral Daily    FLUoxetine 40 mg Oral Daily    hydrALAZINE 5 mg Intravenous Q6H PRN    HYDROmorphone 0 2 mg Intravenous Q4H PRN    HYDROmorphone 0 5 mg Intravenous Q4H PRN    metoprolol succinate 25 mg Oral Daily With Dinner    ondansetron 4 mg Intravenous Q6H PRN    pravastatin 40 mg Oral Daily With Dinner    sodium chloride 125 mL/hr Intravenous Continuous Last Rate: 125 mL/hr (08/17/19 0534)   tamsulosin 0 4 mg Oral Daily With [address] Utilization Review Department  Phone: [phone]; Fax [phone]  [email or web address]  ATTENTION: Please call with any questions or concerns to [phone]  and carefully listen to the prompts so that you are directed to the right person  Send all requests for admission clinical reviews, approved or denied determinations and any other requests to fax [phone]   All voicemails are confidential

## 2019-08-17 NOTE — ASSESSMENT & PLAN NOTE
· Present upon admission, creatinine is 1 45  His baseline appears to room 1 0   · This is post renal setting of obstructive uropathy  · IV fluids, monitor for passage of stone  · Monitor BMP daily

## 2019-08-17 NOTE — ED PROVIDER NOTES
History  Chief Complaint   Patient presents with    Flank Pain     PT  complains of sudden onset of left sided flank pain that started in the back and radiated forward, denies any urinary complaints  Patient is a 77year old male with an acute onset of left flank pain with radiation to left abdomen since midnight tonight  No prior episodes  No fever  No N/V/D  No urinary sx  No abdominal surgery  No rash  Was last seen in this ED on 5/19/18 for URI  [facility] website checked on this patient and last Rx filled was on 12/14/18 for vicodin for 5 day supply  History provided by:  Patient and EMS personnel   used: No    Flank Pain   Associated symptoms: no diarrhea, no fever, no nausea and no vomiting        Prior to Admission Medications   Prescriptions Last Dose Informant Patient Reported? Taking? Cholecalciferol 2000 units CAPS   Yes Yes   Sig: Take 5,000 Units by mouth daily   FLUoxetine (PROzac) 40 MG capsule   Yes Yes   Sig: Take 40 mg by mouth daily   acetaminophen (TYLENOL) 500 mg tablet   Yes Yes   Sig: Take 1,000 mg by mouth every 6 (six) hours as needed   amLODIPine (NORVASC) 5 mg tablet   Yes Yes   Sig: Take 5 mg by mouth daily   dabigatran etexilate (PRADAXA) 150 mg capsu   Yes Yes   Sig: Take 150 mg by mouth 2 (two) times a day   famotidine (PEPCID) 20 mg tablet   Yes Yes   Sig: Take 20 mg by mouth 2 (two) times a day   metoprolol succinate (TOPROL-XL) 25 mg 24 hr tablet   Yes Yes   Sig: Take 25 mg by mouth daily   pravastatin (PRAVACHOL) 20 mg tablet   Yes Yes   Sig: Take 40 mg by mouth daily      Facility-Administered Medications: None       Past Medical History:   Diagnosis Date    Anxiety     Depression     Hyperlipidemia     Hypertension        Past Surgical History:   Procedure Laterality Date    KNEE ARTHROSCOPY Left     SHOULDER ARTHROSCOPY Right        History reviewed  No pertinent family history    I have reviewed and agree with the history as documented  Social History     Tobacco Use    Smoking status: Never Smoker    Smokeless tobacco: Never Used   Substance Use Topics    Alcohol use: No    Drug use: No        Review of Systems   Constitutional: Negative for fever  Gastrointestinal: Positive for abdominal pain  Negative for diarrhea, nausea and vomiting  Genitourinary: Positive for flank pain  Negative for difficulty urinating  Skin: Negative for rash  All other systems reviewed and are negative  Physical Exam  Physical Exam   Constitutional: He is oriented to person, place, and time  He appears well-developed and well-nourished  He appears distressed (moderate)  HENT:   Head: Normocephalic and atraumatic  Mucous membranes somewhat moist     Eyes: No scleral icterus  Neck: No JVD present  No tracheal deviation present  Cardiovascular: Normal rate, regular rhythm and normal heart sounds  No murmur heard  Pulmonary/Chest: Effort normal and breath sounds normal  No respiratory distress  Abdominal: Soft  Bowel sounds are normal  He exhibits no distension  There is no tenderness  There is no rebound and no guarding  L CVAT  Musculoskeletal: He exhibits no edema or deformity  Neurological: He is alert and oriented to person, place, and time  Skin: Skin is warm and dry  No rash noted  Psychiatric: He has a normal mood and affect  Nursing note and vitals reviewed        Vital Signs  ED Triage Vitals [08/17/19 0336]   Temperature Pulse Respirations Blood Pressure SpO2   97 8 °F (36 6 °C) 78 18 (!) 199/92 95 %      Temp Source Heart Rate Source Patient Position - Orthostatic VS BP Location FiO2 (%)   Oral Monitor Sitting Right arm --      Pain Score       Worst Possible Pain           Vitals:    08/17/19 0336 08/17/19 0350 08/17/19 0535   BP: (!) 199/92 (!) 174/93 (!) 179/93   Pulse: 78 74 66   Patient Position - Orthostatic VS: Sitting Sitting Sitting         Visual Acuity      ED Medications  Medications   sodium chloride 0 9 % infusion (125 mL/hr Intravenous New Bag 8/17/19 0534)   sodium chloride 0 9 % bolus 1,000 mL (0 mL Intravenous Stopped 8/17/19 0524)   ketorolac (TORADOL) injection 15 mg (15 mg Intravenous Given 8/17/19 0359)   tamsulosin (FLOMAX) capsule 0 4 mg (0 4 mg Oral Given 8/17/19 0531)       Diagnostic Studies  Results Reviewed     Procedure Component Value Units Date/Time    Urine culture [[id number]] Collected:  08/17/19 0355    Lab Status:   In process Specimen:  Urine, Clean Catch Updated:  08/17/19 0419    Comprehensive metabolic panel [[id number]]  (Abnormal) Collected:  08/17/19 0350    Lab Status:  Final result Specimen:  Blood from Arm, Left Updated:  08/17/19 0409     Sodium 139 mmol/L      Potassium 4 5 mmol/L      Chloride 102 mmol/L      CO2 27 mmol/L      ANION GAP 10 mmol/L      BUN 26 mg/dL      Creatinine 1 45 mg/dL      Glucose 118 mg/dL      Calcium 9 1 mg/dL      AST 18 U/L      ALT 24 U/L      Alkaline Phosphatase 70 U/L      Total Protein 7 2 g/dL      Albumin 3 5 g/dL      Total Bilirubin 0 40 mg/dL      eGFR 49 ml/min/1 73sq m     Narrative:       [place] guidelines for Chronic Kidney Disease (CKD):     Stage 1 with normal or high GFR (GFR > 90 mL/min/1 73 square meters)    Stage 2 Mild CKD (GFR = 60-89 mL/min/1 73 square meters)    Stage 3A Moderate CKD (GFR = 45-59 mL/min/1 73 square meters)    Stage 3B Moderate CKD (GFR = 30-44 mL/min/1 73 square meters)    Stage 4 Severe CKD (GFR = 15-29 mL/min/1 73 square meters)    Stage 5 End Stage CKD (GFR <15 mL/min/1 73 square meters)  Note: GFR calculation is accurate only with a steady state creatinine    Lipase [[id number]]  (Normal) Collected:  08/17/19 0350    Lab Status:  Final result Specimen:  Blood from Arm, Left Updated:  08/17/19 0409     Lipase 105 u/L     CK Total with Reflex CKMB [[id number]]  (Normal) Collected:  08/17/19 0350    Lab Status:  Final result Specimen:  Blood from Arm, Left Updated: 08/17/19 0409     Total CK 75 U/L     Urine Microscopic [[id number]]  (Abnormal) Collected:  08/17/19 0406    Lab Status:  Final result Specimen:  Urine Updated:  08/17/19 0408     RBC, UA Innumerable /hpf      WBC, UA 0-1 /hpf      Epithelial Cells None Seen /hpf      Bacteria, UA Moderate /hpf      AMORPH URATES Occasional /hpf     CBC and differential [[id number]]  (Abnormal) Collected:  08/17/19 0350    Lab Status:  Final result Specimen:  Blood from Arm, Left Updated:  08/17/19 0358     WBC 12 81 Thousand/uL      RBC 4 97 Million/uL      Hemoglobin 14 7 g/dL      Hematocrit 45 2 %      MCV 91 fL      MCH 29 6 pg      MCHC 32 5 g/dL      RDW 13 7 %      MPV 9 9 fL      Platelets 571 Thousands/uL      nRBC 0 /100 WBCs      Neutrophils Relative 73 %      Immat GRANS % 1 %      Lymphocytes Relative 14 %      Monocytes Relative 10 %      Eosinophils Relative 2 %      Basophils Relative 0 %      Neutrophils Absolute 9 45 Thousands/µL      Immature Grans Absolute 0 12 Thousand/uL      Lymphocytes Absolute 1 74 Thousands/µL      Monocytes Absolute 1 27 Thousand/µL      Eosinophils Absolute 0 19 Thousand/µL      Basophils Absolute 0 04 Thousands/µL     ED Urine Macroscopic [[id number]]  (Abnormal) Collected:  08/17/19 0406    Lab Status:  Final result Specimen:  Urine Updated:  08/17/19 0355     Color, UA Yellow     Clarity, UA Clear     pH, UA 5 5     Leukocytes, UA Negative     Nitrite, UA Negative     Protein,  (2+) mg/dl      Glucose, UA Negative mg/dl      Ketones, UA Trace mg/dl      Urobilinogen, UA 0 2 E U /dl      Bilirubin, UA Negative     Blood, UA Large     Specific Gravity, UA >=1 030    Narrative:       [facility] RESULT                 CT renal stone study abdomen pelvis without contrast   ED Interpretation by Elroy Rahman MD (08/17 0520)   FINDINGS:      RIGHT KIDNEY AND URETER:   No urinary tract calculi   No hydronephrosis or hydroureter         Bifid collecting system           LEFT KIDNEY AND URETER:   There is moderate hydroureteronephrosis, up to the level of a 2 mm calculus in the distal ureter, above the ureterovesical junction   This is located at the level of the ischial tuberosities (series 2, image 130 and series 601, image 110)   There is    moderate perinephric and periureteric inflammation, up to the level of the calculus   Small amount of perinephric fluid adjacent to the lower pole   No focal collection to suggest an abscess  No additional renal or ureteric calculi are seen  URINARY BLADDER:    Unremarkable  The prostate gland is surgically absent  There is a small umbilical hernia containing fat   There are bilateral inguinal hernias containing fat, left side larger than right  Emphysematous changes are present in the lower lobes of the lungs bilaterally   There is focal eventration of the right hemidiaphragm  Limited low radiation dose noncontrast CT evaluation demonstrates no clinically significant abnormality of the spleen or pancreas  Fatty infiltrative changes are present in the liver  There is a nodular configuration of the adrenal glands   Low-density bilateral adrenal lesions, most compatible with benign adrenal adenomas, requiring no further interrogation  No calcified gallstones or gallbladder wall thickening noted  No ascites or bulky lymphadenopathy on this limited noncontrast study  Evaluation of the GI tract is limited due to lack of oral contrast material       The stomach is decompressed  Isha Betters is a hiatal hernia  There is no evidence of small bowel obstruction  Moderate amount of feces in the colon, compatible with constipation  Limited evaluation demonstrates no evidence to suggest acute appendicitis  No acute fracture or destructive osseous lesion is identified  Degenerative changes in the lumbar spine and bilateral hips     Impression:     1   Moderately obstructing 2 mm distal left ureteric calculus, above the ureterovesical junction  2   Moderate-sized hiatal hernia  The study was marked in [facility] for immediate notification  Workstation performed: [id number]         Final Result by Josephine Calderon DO (08/17 0515)   1  Moderately obstructing 2 mm distal left ureteric calculus, above the ureterovesical junction  2   Moderate-sized hiatal hernia  The study was marked in [facility] for immediate notification  Workstation performed: [id number]                    Procedures  Procedures       ED Course  ED Course as of Aug 17 0540   Sat Aug 17, 2019   0451 Pain improved  Labs d/w patient and sister and friend with patient's permission  0504 CT d/w patient and sister and friend  MDM  Number of Diagnoses or Management Options  Diagnosis management comments: DDx including but not limited to: renal colic, pyelonephritis, UTI, GI etiology, appendicitis, diverticulitis, pancreatitis, cholecystitis, biliary colic, doubt AAA, rhabdomyolysis, tumor, zoster          Amount and/or Complexity of Data Reviewed  Clinical lab tests: ordered and reviewed  Tests in the radiology section of CPT®: ordered and reviewed  Decide to obtain previous medical records or to obtain history from someone other than the patient: yes  Review and summarize past medical records: yes  Independent visualization of images, tracings, or specimens: yes        Disposition  Final diagnoses:   Renal colic on left side   RICHIE (acute kidney injury) ([facility] [address] )     Time reflects when diagnosis was documented in both MDM as applicable and the Disposition within this note     Time User Action Codes Description Comment    8/17/2019  5:39 AM Ely Mooring Add [V89] Renal colic on left side     8/17/2019  5:39 AM Ely Mooring Add [N17 9] RICHIE (acute kidney injury) [facility])       ED Disposition     ED Disposition Condition Date/Time Comment    Admit Stable Sat Aug 17, 2019 5:39 AM Case was discussed with LINNETTE Lauren and the patient's admission status was agreed to be Admission Status: observation status to the service of Dr Murali Carr   Follow-up Information    None         Patient's Medications   Discharge Prescriptions    No medications on file     No discharge procedures on file      ED Provider  Electronically Signed by           Quin Schaumann, MD  08/17/19 6637

## 2019-08-17 NOTE — H&P
H&P- Yael Eugene 1950, 77 y o  male MRN: [id number]  Unit/Bed#: -01 Encounter: [id number]  Primary Care Provider: Whitney Harris MD   Date and time admitted to hospital: 8/17/2019  3:33 AM    * Ureterolithiasis  Assessment & Plan  · Patient complained of left-sided flank pain, radiating into his abdomen  Started tonight  No history of kidney stones in the past   No fevers or chills, issues with urination  No urinary symptoms  · CT: "Moderately obstructing 2 mm distal left ureteric calculus, above the ureterovesical junction "  · Urine is not infected  · Flomax  · IV fluids  · Strain urine  · Pain control  · If the patient does not pass the stone today, consider urologic consultation    Paroxysmal atrial fibrillation (HCC)  Assessment & Plan  · Currently in normal sinus rhythm  · Continue Pradaxa  Continue metoprolol  · Monitor heart rate  Depression  Assessment & Plan  · Continue Prozac  · Outpatient psychiatry follow-up  [facility]  Assessment & Plan  · Present upon admission, creatinine is 1 45  His baseline appears to room 1 0   · This is post renal setting of obstructive uropathy  · IV fluids, monitor for passage of stone  · Monitor BMP daily  GERD (gastroesophageal reflux disease)  Assessment & Plan  · Asymptomatic  · Renally dosed Pepcid  · Monitor  Essential hypertension  Assessment & Plan  · With elevated blood pressures in the emergency department secondary to pain  · Continue metoprolol  · Holding amlodipine in setting of RICHIE  · Will add hydralazine p r n   · Monitor blood pressure  Hyperlipidemia  Assessment & Plan  · Continue statin  VTE Prophylaxis: Dabigatran (Pradaxa)  / sequential compression device   Code Status: FULL CODE  POLST: POLST form is not discussed and not completed at this time    Discussion with family: patient and friend    Anticipated Length of Stay:  Patient will be admitted on an Observation basis with an anticipated length of stay of less than 2 midnights  Justification for Hospital Stay: RICHIE, kidney stone    Total Time for Visit, including Counseling / Coordination of Care: 1 hour  Greater than 50% of this total time spent on direct patient counseling and coordination of care  Chief Complaint:   Left flank pain    History of Present Illness:    Yael Eugene is a 77 y o  male who presents with left-sided flank and abdominal pain starting tonight  It was abrupt in onset  It was a sharp pain in his left side  Radiated to the front of his abdomen  Denies any associated nausea vomiting, urinary symptoms, fevers, chills  Reports his urine has been somewhat bloody starting 2 days ago  It was not gross hematuria, however some pinkish urine was noted tonight  He has been taking all his medications as directed  Has never had a kidney stone in the past  Reports his pain is much more controlled at this time  Patient does not have any questions  Remainder of review of systems as below  Review of Systems:    Review of Systems   Constitutional: Negative for activity change, appetite change, chills, fatigue and fever  HENT: Negative for congestion, rhinorrhea, sinus pressure and sore throat  Eyes: Negative for photophobia, pain and visual disturbance  Respiratory: Negative for cough, shortness of breath and wheezing  Cardiovascular: Negative for chest pain, palpitations and leg swelling  Gastrointestinal: Positive for abdominal pain  Negative for abdominal distention, constipation, diarrhea, nausea and vomiting  Endocrine: Negative for cold intolerance, heat intolerance, polydipsia and polyuria  Genitourinary: Positive for flank pain and hematuria  Negative for difficulty urinating, dysuria and frequency  Musculoskeletal: Negative for arthralgias, back pain and joint swelling  Skin: Negative for color change, pallor and rash  Allergic/Immunologic: Negative      Neurological: Negative for dizziness, syncope, weakness, light-headedness and headaches  Hematological: Negative  Psychiatric/Behavioral: Negative  Past Medical and Surgical History:     Past Medical History:   Diagnosis Date    Anxiety     Depression     Hyperlipidemia     Hypertension        Past Surgical History:   Procedure Laterality Date    KNEE ARTHROSCOPY Left     SHOULDER ARTHROSCOPY Right        Meds/Allergies:    Prior to Admission medications    Medication Sig Start Date End Date Taking? Authorizing Provider   acetaminophen (TYLENOL) 500 mg tablet Take 1,000 mg by mouth every 6 (six) hours as needed   Yes Historical Provider, MD   amLODIPine (NORVASC) 5 mg tablet Take 5 mg by mouth daily 12/29/16  Yes Historical Provider, MD   Cholecalciferol 2000 units CAPS Take 5,000 Units by mouth daily   Yes Historical Provider, MD   dabigatran etexilate (PRADAXA) 150 mg capsu Take 150 mg by mouth 2 (two) times a day   Yes Historical Provider, MD   famotidine (PEPCID) 20 mg tablet Take 20 mg by mouth 2 (two) times a day   Yes Historical Provider, MD   FLUoxetine (PROzac) 40 MG capsule Take 40 mg by mouth daily 12/29/16  Yes Historical Provider, MD   metoprolol succinate (TOPROL-XL) 25 mg 24 hr tablet Take 25 mg by mouth daily   Yes Historical Provider, MD   pravastatin (PRAVACHOL) 20 mg tablet Take 40 mg by mouth daily   Yes Historical Provider, MD     I have reviewed home medications with patient family member  Allergies: Allergies   Allergen Reactions    Percocet [Oxycodone-Acetaminophen] Hyperactivity     hyperactivity       Social History:     Marital Status:     Occupation: disabled  Patient Pre-hospital Living Situation: [place]  Patient Pre-hospital Level of Mobility: walker at baseline  Patient Pre-hospital Diet Restrictions: none  Substance Use History:   Social History     Substance and Sexual Activity   Alcohol Use No     Social History     Tobacco Use   Smoking Status Never Smoker   Smokeless Tobacco Never Used     Social History     Substance and Sexual Activity   Drug Use No       Family History:    History reviewed  No pertinent family history  Physical Exam:     Vitals:   Blood Pressure: 158/76 (08/17/19 0600)  Pulse: 70 (08/17/19 0600)  Temperature: 98 5 °F (36 9 °C) (08/17/19 0600)  Temp Source: Oral (08/17/19 0600)  Respirations: 18 (08/17/19 0600)  Height: 5' 10" (177 8 cm) (08/17/19 0600)  Weight - Scale: 96 9 kg (213 lb 10 oz) (08/17/19 0600)  SpO2: 94 % (08/17/19 0600)    Physical Exam   Constitutional: He is oriented to person, place, and time  He appears well-developed and well-nourished  No distress  HENT:   Head: Normocephalic and atraumatic  Mouth/Throat: Oropharynx is clear and moist    Eyes: Pupils are equal, round, and reactive to light  EOM are normal  No scleral icterus  Neck: Neck supple  Cardiovascular: Normal rate, regular rhythm and normal heart sounds  No murmur heard  Pulmonary/Chest: Effort normal and breath sounds normal  No respiratory distress  He has no wheezes  He has no rales  Abdominal: Soft  Bowel sounds are normal  He exhibits no distension  There is no tenderness  There is CVA tenderness (left)  Musculoskeletal: He exhibits no deformity  Neurological: He is alert and oriented to person, place, and time  No cranial nerve deficit  GCS eye subscore is 4  GCS verbal subscore is 5  GCS motor subscore is 6  Skin: Skin is warm and dry  Capillary refill takes less than 2 seconds  No rash noted  He is not diaphoretic  No erythema  No pallor  Psychiatric: He has a normal mood and affect  Nursing note and vitals reviewed  Additional Data:     Lab Results: I have personally reviewed pertinent reports        Results from last 7 days   Lab Units 08/17/19  0350   WBC Thousand/uL 12 81*   HEMOGLOBIN g/dL 14 7   HEMATOCRIT % 45 2   PLATELETS Thousands/uL 257   NEUTROS PCT % 73   LYMPHS PCT % 14   MONOS PCT % 10   EOS PCT % 2     Results from last 7 days Lab Units 08/17/19  0350   SODIUM mmol/L 139   POTASSIUM mmol/L 4 5   CHLORIDE mmol/L 102   CO2 mmol/L 27   BUN mg/dL 26*   CREATININE mg/dL 1 45*   ANION GAP mmol/L 10   CALCIUM mg/dL 9 1   ALBUMIN g/dL 3 5   TOTAL BILIRUBIN mg/dL 0 40   ALK PHOS U/L 70   ALT U/L 24   AST U/L 18   GLUCOSE RANDOM mg/dL 118                       Imaging: I have personally reviewed pertinent reports  CT renal stone study abdomen pelvis without contrast   ED Interpretation by Josue Montaño MD (18/08 0520)   FINDINGS:      RIGHT KIDNEY AND URETER:   No urinary tract calculi   No hydronephrosis or hydroureter         Bifid collecting system  LEFT KIDNEY AND URETER:   There is moderate hydroureteronephrosis, up to the level of a 2 mm calculus in the distal ureter, above the ureterovesical junction   This is located at the level of the ischial tuberosities (series 2, image 130 and series 601, image 110)   There is    moderate perinephric and periureteric inflammation, up to the level of the calculus   Small amount of perinephric fluid adjacent to the lower pole   No focal collection to suggest an abscess  No additional renal or ureteric calculi are seen  URINARY BLADDER:    Unremarkable  The prostate gland is surgically absent  There is a small umbilical hernia containing fat   There are bilateral inguinal hernias containing fat, left side larger than right  Emphysematous changes are present in the lower lobes of the lungs bilaterally   There is focal eventration of the right hemidiaphragm  Limited low radiation dose noncontrast CT evaluation demonstrates no clinically significant abnormality of the spleen or pancreas  Fatty infiltrative changes are present in the liver  There is a nodular configuration of the adrenal glands   Low-density bilateral adrenal lesions, most compatible with benign adrenal adenomas, requiring no further interrogation        No calcified gallstones or gallbladder wall thickening noted  No ascites or bulky lymphadenopathy on this limited noncontrast study  Evaluation of the GI tract is limited due to lack of oral contrast material       The stomach is decompressed  Mk Ott is a hiatal hernia  There is no evidence of small bowel obstruction  Moderate amount of feces in the colon, compatible with constipation  Limited evaluation demonstrates no evidence to suggest acute appendicitis  No acute fracture or destructive osseous lesion is identified  Degenerative changes in the lumbar spine and bilateral hips  Impression:     1   Moderately obstructing 2 mm distal left ureteric calculus, above the ureterovesical junction  2   Moderate-sized hiatal hernia  The study was marked in [facility] for immediate notification  Workstation performed: [id number]         Final Result by Imani Heart DO (08/17 0515)   1  Moderately obstructing 2 mm distal left ureteric calculus, above the ureterovesical junction  2   Moderate-sized hiatal hernia  The study was marked in [facility] for immediate notification  Workstation performed: [id number]             EKG, Pathology, and Other Studies Reviewed on Admission:   · None available    Allscripts / Epic Records Reviewed: Yes     ** Please Note: This note has been constructed using a voice recognition system   **

## 2019-08-17 NOTE — ASSESSMENT & PLAN NOTE
· Patient complained of left-sided flank pain, radiating into his abdomen  Started tonight  No history of kidney stones in the past   No fevers or chills, issues with urination  No urinary symptoms  · CT: "Moderately obstructing 2 mm distal left ureteric calculus, above the ureterovesical junction "  · Urine is not infected    · Flomax  · IV fluids  · Strain urine  · Pain control  · If the patient does not pass the stone today, consider urologic consultation

## 2019-08-17 NOTE — ASSESSMENT & PLAN NOTE
· With elevated blood pressures in the emergency department secondary to pain  · Continue metoprolol  · Holding amlodipine in setting of RICHIE  · Will add hydralazine p r n   · Monitor blood pressure

## 2019-08-17 NOTE — PLAN OF CARE
Problem: Potential for Falls  Goal: Patient will remain free of falls  Description  INTERVENTIONS:  - Assess patient frequently for physical needs  -  Identify cognitive and physical deficits and behaviors that affect risk of falls    -  [place] fall precautions as indicated by assessment   - Educate patient/family on patient safety including physical limitations  - Instruct patient to call for assistance with activity based on assessment  - Modify environment to reduce risk of injury  - Consider OT/PT consult to assist with strengthening/mobility  Outcome: Progressing     Problem: PAIN - ADULT  Goal: Verbalizes/displays adequate comfort level or baseline comfort level  Description  Interventions:  - Encourage patient to monitor pain and request assistance  - Assess pain using appropriate pain scale  - Administer analgesics based on type and severity of pain and evaluate response  - Implement non-pharmacological measures as appropriate and evaluate response  - Consider cultural and social influences on pain and pain management  - Notify physician/advanced practitioner if interventions unsuccessful or patient reports new pain  Outcome: Progressing     Problem: GASTROINTESTINAL - ADULT  Goal: Minimal or absence of nausea and/or vomiting  Description  INTERVENTIONS:  - Administer IV fluids if ordered to ensure adequate hydration  - Maintain NPO status until nausea and vomiting are resolved  - Nasogastric tube if ordered  - Administer ordered antiemetic medications as needed  - Provide nonpharmacologic comfort measures as appropriate  - Advance diet as tolerated, if ordered  - Consider nutrition services referral to assist patient with adequate nutrition and appropriate food choices  Outcome: Progressing     Problem: GENITOURINARY - ADULT  Goal: Maintains or returns to baseline urinary function  Description  INTERVENTIONS:  - Assess urinary function  - Encourage oral fluids to ensure adequate hydration if ordered  - Administer IV fluids as ordered to ensure adequate hydration  - Administer ordered medications as needed  - Offer frequent toileting  - Follow urinary retention protocol if ordered  Outcome: Progressing     Problem: METABOLIC, FLUID AND ELECTROLYTES - ADULT  Goal: Electrolytes maintained within normal limits  Description  INTERVENTIONS:  - Monitor labs and assess patient for signs and symptoms of electrolyte imbalances  - Administer electrolyte replacement as ordered  - Monitor response to electrolyte replacements, including repeat lab results as appropriate  - Instruct patient on fluid and nutrition as appropriate  Outcome: Progressing  Goal: Fluid balance maintained  Description  INTERVENTIONS:  - Monitor labs   - Monitor I/O and WT  - Instruct patient on fluid and nutrition as appropriate  - Assess for signs & symptoms of volume excess or deficit  Outcome: Progressing

## 2019-08-18 ENCOUNTER — APPOINTMENT (INPATIENT)
Dept: ULTRASOUND IMAGING | Facility: HOSPITAL | Age: 69
DRG: 694 | End: 2019-08-18
Payer: COMMERCIAL

## 2019-08-18 LAB
ANION GAP SERPL CALCULATED.3IONS-SCNC: 9 MMOL/L (ref 4–13)
BACTERIA UR CULT: NORMAL
BASOPHILS # BLD AUTO: 0.04 THOUSANDS/ΜL (ref 0–0.1)
BASOPHILS NFR BLD AUTO: 1 % (ref 0–1)
BUN SERPL-MCNC: 25 MG/DL (ref 5–25)
CALCIUM SERPL-MCNC: 7.9 MG/DL (ref 8.3–10.1)
CHLORIDE SERPL-SCNC: 109 MMOL/L (ref 100–108)
CO2 SERPL-SCNC: 24 MMOL/L (ref 21–32)
CREAT SERPL-MCNC: 1.58 MG/DL (ref 0.6–1.3)
EOSINOPHIL # BLD AUTO: 0.26 THOUSAND/ΜL (ref 0–0.61)
EOSINOPHIL NFR BLD AUTO: 3 % (ref 0–6)
ERYTHROCYTE [DISTWIDTH] IN BLOOD BY AUTOMATED COUNT: 14 % (ref 11.6–15.1)
GFR SERPL CREATININE-BSD FRML MDRD: 44 ML/MIN/1.73SQ M
GLUCOSE SERPL-MCNC: 98 MG/DL (ref 65–140)
HCT VFR BLD AUTO: 41.5 % (ref 36.5–49.3)
HGB BLD-MCNC: 12.5 G/DL (ref 12–17)
IMM GRANULOCYTES # BLD AUTO: 0.08 THOUSAND/UL (ref 0–0.2)
IMM GRANULOCYTES NFR BLD AUTO: 1 % (ref 0–2)
LYMPHOCYTES # BLD AUTO: 1.11 THOUSANDS/ΜL (ref 0.6–4.47)
LYMPHOCYTES NFR BLD AUTO: 13 % (ref 14–44)
MCH RBC QN AUTO: 29.6 PG (ref 26.8–34.3)
MCHC RBC AUTO-ENTMCNC: 30.1 G/DL (ref 31.4–37.4)
MCV RBC AUTO: 98 FL (ref 82–98)
MONOCYTES # BLD AUTO: 1 THOUSAND/ΜL (ref 0.17–1.22)
MONOCYTES NFR BLD AUTO: 12 % (ref 4–12)
MRSA NOSE QL CULT: NORMAL
NEUTROPHILS # BLD AUTO: 6.13 THOUSANDS/ΜL (ref 1.85–7.62)
NEUTS SEG NFR BLD AUTO: 70 % (ref 43–75)
NRBC BLD AUTO-RTO: 0 /100 WBCS
PLATELET # BLD AUTO: 181 THOUSANDS/UL (ref 149–390)
PMV BLD AUTO: 10.2 FL (ref 8.9–12.7)
POTASSIUM SERPL-SCNC: 4.8 MMOL/L (ref 3.5–5.3)
RBC # BLD AUTO: 4.22 MILLION/UL (ref 3.88–5.62)
SODIUM SERPL-SCNC: 142 MMOL/L (ref 136–145)
WBC # BLD AUTO: 8.62 THOUSAND/UL (ref 4.31–10.16)

## 2019-08-18 PROCEDURE — 76770 US EXAM ABDO BACK WALL COMP: CPT

## 2019-08-18 PROCEDURE — 80048 BASIC METABOLIC PNL TOTAL CA: CPT | Performed by: PHYSICIAN ASSISTANT

## 2019-08-18 PROCEDURE — 99232 SBSQ HOSP IP/OBS MODERATE 35: CPT | Performed by: INTERNAL MEDICINE

## 2019-08-18 PROCEDURE — 99223 1ST HOSP IP/OBS HIGH 75: CPT | Performed by: UROLOGY

## 2019-08-18 PROCEDURE — 85025 COMPLETE CBC W/AUTO DIFF WBC: CPT | Performed by: PHYSICIAN ASSISTANT

## 2019-08-18 RX ORDER — AMLODIPINE BESYLATE 5 MG/1
5 TABLET ORAL DAILY
Status: DISCONTINUED | OUTPATIENT
Start: 2019-08-18 | End: 2019-08-20 | Stop reason: HOSPADM

## 2019-08-18 RX ADMIN — VITAMIN D, TAB 1000IU (100/BT) 2000 UNITS: 25 TAB at 08:08

## 2019-08-18 RX ADMIN — SODIUM CHLORIDE 125 ML/HR: 0.9 INJECTION, SOLUTION INTRAVENOUS at 16:45

## 2019-08-18 RX ADMIN — HYDROMORPHONE HYDROCHLORIDE 0.2 MG: 1 INJECTION, SOLUTION INTRAMUSCULAR; INTRAVENOUS; SUBCUTANEOUS at 12:35

## 2019-08-18 RX ADMIN — FLUOXETINE HYDROCHLORIDE 40 MG: 20 CAPSULE ORAL at 08:07

## 2019-08-18 RX ADMIN — DABIGATRAN ETEXILATE MESYLATE 150 MG: 150 CAPSULE ORAL at 08:08

## 2019-08-18 RX ADMIN — FAMOTIDINE 10 MG: 20 TABLET ORAL at 08:08

## 2019-08-18 RX ADMIN — DABIGATRAN ETEXILATE MESYLATE 150 MG: 150 CAPSULE ORAL at 20:08

## 2019-08-18 RX ADMIN — SODIUM CHLORIDE 125 ML/HR: 0.9 INJECTION, SOLUTION INTRAVENOUS at 08:06

## 2019-08-18 RX ADMIN — PRAVASTATIN SODIUM 40 MG: 40 TABLET ORAL at 15:48

## 2019-08-18 RX ADMIN — AMLODIPINE BESYLATE 5 MG: 5 TABLET ORAL at 15:48

## 2019-08-18 RX ADMIN — METOPROLOL SUCCINATE 25 MG: 25 TABLET, EXTENDED RELEASE ORAL at 15:48

## 2019-08-18 RX ADMIN — TAMSULOSIN HYDROCHLORIDE 0.4 MG: 0.4 CAPSULE ORAL at 15:48

## 2019-08-18 RX ADMIN — HYDROMORPHONE HYDROCHLORIDE 0.2 MG: 1 INJECTION, SOLUTION INTRAMUSCULAR; INTRAVENOUS; SUBCUTANEOUS at 08:11

## 2019-08-18 NOTE — CONSULTS
UROLOGY CONSULTATION NOTE     Patient Identifiers: Stepan Miranda (MRN [id number])  Service Requesting Consultation:  Hospital Medicine  Service Providing Consultation:  Urology, Lucina Pearson MD    Date of Service: 8/18/2019  Consults    Reason for Consultation:  Left renal colic    History of Present Illness:     Stepan Miranda is a 77 y o  old with onset of left flank pain the day before yesterday  CT scan showed 2 mm stone distal left ureter, about 2 cm above the bladder  Was discharged year, then pain worse and he came back  Pain is now level 4/10  No fevers, chills, burning, urgency  No prior stones  Underwent robotic radical prostatectomy at [facility] in December 2018  Has good urinary control and has been told this PSA is 0      ASSESSMENT & PLAN:     A stone this small has a high chance of passing spontaneously  I recommend another 24 hours observation before deciding if patient needs ureteroscopy  He is on Flomax, getting good hydration both oral and IV, and we will follow closely      Past Medical, Past Surgical History:     Past Medical History:   Diagnosis Date    Anxiety     Depression     Hyperlipidemia     Hypertension    :    Past Surgical History:   Procedure Laterality Date    KNEE ARTHROSCOPY Left     SHOULDER ARTHROSCOPY Right    :    Medications, Allergies:     Current Facility-Administered Medications   Medication Dose Route Frequency    acetaminophen (TYLENOL) tablet 650 mg  650 mg Oral Q6H PRN    cholecalciferol (VITAMIN D3) tablet 2,000 Units  2,000 Units Oral Daily    dabigatran etexilate (PRADAXA) capsule 150 mg  150 mg Oral BID    famotidine (PEPCID) tablet 10 mg  10 mg Oral Daily    FLUoxetine (PROzac) capsule 40 mg  40 mg Oral Daily    hydrALAZINE (APRESOLINE) injection 5 mg  5 mg Intravenous Q6H PRN    HYDROmorphone (DILAUDID) injection 0 2 mg  0 2 mg Intravenous Q4H PRN    HYDROmorphone (DILAUDID) injection 0 5 mg  0 5 mg Intravenous Q4H PRN    metoprolol succinate (TOPROL-XL) 24 hr tablet 25 mg  25 mg Oral Daily With Dinner    ondansetron [facility]) injection 4 mg  4 mg Intravenous Q6H PRN    pravastatin (PRAVACHOL) tablet 40 mg  40 mg Oral Daily With Dinner    sodium chloride 0 9 % infusion  125 mL/hr Intravenous Continuous    tamsulosin (FLOMAX) capsule 0 4 mg  0 4 mg Oral Daily With Dinner       Allergies: Allergies   Allergen Reactions    Percocet [Oxycodone-Acetaminophen] Hyperactivity     hyperactivity   :    Social and Family History:   Social History:   Social History     Tobacco Use    Smoking status: Never Smoker    Smokeless tobacco: Never Used   Substance Use Topics    Alcohol use: No    Drug use: No        Social History     Tobacco Use   Smoking Status Never Smoker   Smokeless Tobacco Never Used       Family History:  History reviewed  No pertinent family history :     Review of Systems:     General: [place] Fever, chills, or night sweats  Cardiac: Negative for chest pain  Pulmonary: Negative for shortness of breath  Gastrointestinal: Denies Abdominal pain, no  nausea, vomiting, or diarrhea   Genitourinary: See HPI above  Neurologic: No focal signs   Musculo-skeletal: No complaints    All other systems queried were negative  Physical Exam:     General appearance: alert and oriented, in no acute distress  Head: Normocephalic, without obvious abnormality, atraumatic  Eyes: conjunctivae/corneas clear  PERRL, EOM's intact  Fundi benign  Throat: lips, mucosa, and tongue normal; teeth and gums normal  Neck: no adenopathy, no carotid bruit, no JVD, supple, symmetrical, trachea midline and thyroid not enlarged, symmetric, no tenderness/mass/nodules  Back: symmetric, no curvature  ROM normal  No CVA tenderness    Lungs: clear to auscultation bilaterally  Chest wall: no tenderness  Heart: regular rate and rhythm, S1, S2 normal, no murmur, click, rub or gallop  Abdomen: soft, non-tender; bowel sounds normal; no masses,  no organomegaly  Male genitalia: normal  Extremities: extremities normal, warm and well-perfused; no cyanosis, clubbing, or edema  Lymph nodes: Cervical, supraclavicular, and axillary nodes normal   Neurologic: Grossly normal      Labs:     Lab Results   Component Value Date    HGB 12 5 08/18/2019    HCT 41 5 08/18/2019    WBC 8 62 08/18/2019     08/18/2019   ]    Lab Results   Component Value Date    K 4 8 08/18/2019     (H) 08/18/2019    CO2 24 08/18/2019    BUN 25 08/18/2019    CREATININE 1 58 (H) 08/18/2019    CALCIUM 7 9 (L) 08/18/2019   ]    Imaging:   I personally reviewed the images and report of the following studies, and reviewed them with the patient:          Thank you for allowing me to participate in this patients care  Please do not hesitate to call with any additional questions    Sandy Washington MD

## 2019-08-18 NOTE — UTILIZATION REVIEW
Continued Stay Review  8/17/2019  0540 OBSERVATION and CHANGED 8/18/2019  1021 INPATIENT RE: patient needs continued stay for medical expulsive therapy and pain control in patient with increasing creatinine and ureterolithiasis  Start   Ordered   08/18/19 1021  Inpatient Admission Once     Transfer Service: General Medicine       Question Answer Comment   Admitting Physician KELSEY ESTEBAN    Level of Care Med Surg    Estimated length of stay More than 2 Midnights    Certification I certify that inpatient services are medically necessary for this patient for a duration of greater than two midnights  See H&P and MD Progress Notes for additional information about the patient's course of treatment  08/18/19 1021       Date: 8/18/2019                    Current Patient Class:INPATIENT  Current Level of Care: med surg     HPI:69 y o  male initially admitted on 8/17/2019 observation due to  Ureterolithiasis/RICHIE  Presented with sudden left flank pain since midnight  On exam has L CVAT  Bun 26, creatinine 1 45 with baseline of 1  Has leukocytosis of 12 81  Ct abdomen showed moderate hydroureteronephrosis up to level of a 2 mm calculus in distal ureter  Medical expulsive therapy in progress  Assessment/Plan: Patient with persistent left flank pain,  Pain control, IVF in progress  With rising creatinine   Per urology - A stone this small has a high chance of passing spontaneously  I recommend another 24 hours  before deciding if patient needs ureteroscopy    He is on Flomax, getting good hydration both oral and IV    Pertinent Labs/Diagnostic Results:   Results from last 7 days   Lab Units 08/18/19  0458 08/17/19  0350   WBC Thousand/uL 8 62 12 81*   HEMOGLOBIN g/dL 12 5 14 7   HEMATOCRIT % 41 5 45 2   PLATELETS Thousands/uL 181 257   NEUTROS ABS Thousands/µL 6 13 9 45*     Results from last 7 days   Lab Units 08/18/19  0458 08/17/19  0350   SODIUM mmol/L 142 139   POTASSIUM mmol/L 4 8 4 5   CHLORIDE mmol/L 109* 102   CO2 mmol/L 24 27   ANION GAP mmol/L 9 10   BUN mg/dL 25 26*   CREATININE mg/dL 1 58* 1 45*   EGFR ml/min/1 73sq m 44 49   CALCIUM mg/dL 7 9* 9 1     Results from last 7 days   Lab Units 08/17/19  0350   AST U/L 18   ALT U/L 24   ALK PHOS U/L 70   TOTAL PROTEIN g/dL 7 2   ALBUMIN g/dL 3 5   TOTAL BILIRUBIN mg/dL 0 40     Results from last 7 days   Lab Units 08/18/19  0458 08/17/19  0350   GLUCOSE RANDOM mg/dL 98 118     Results from last 7 days   Lab Units 08/17/19  0350   CK TOTAL U/L 75     Results from last 7 days   Lab Units 08/17/19  0350   LIPASE u/L 105     Results from last 7 days   Lab Units 08/17/19  0406   CLARITY UA  Clear   COLOR UA  Yellow   SPEC GRAV UA  >=1 030   PH UA  5 5   GLUCOSE UA mg/dl Negative   KETONES UA mg/dl Trace*   BLOOD UA  Large*   PROTEIN UA mg/dl 100 (2+)*   NITRITE UA  Negative   BILIRUBIN UA  Negative   UROBILINOGEN UA E U /dl 0 2   LEUKOCYTES UA  Negative   WBC UA /hpf 0-1*   RBC UA /hpf Innumerable*   BACTERIA UA /hpf Moderate*   EPITHELIAL CELLS WET PREP /hpf None Seen     Results from last 7 days   Lab Units 08/17/19  0355   URINE CULTURE  <10,000 cfu/ml      Vital Signs:   08/18/19 0749  97 3 °F (36 3 °C)Abnormal   69  18  153/77  96 %       08/18/19 0341        154/74      Lying   08/17/19 2208  98 3 °F (36 8 °C)  74  16  166/78  95 %  None (Room air)  Lying   08/17/19 1345  98 1 °F (36 7 °C)  73  18    94 %       08/17/19 0600  98 5 °F (36 9 °C)  70  18  158/76  94 %  None (Room air)  Lying   08/17/19 0535    66  18  179/93Abnormal   94 %  None (Room air       Medications:   Scheduled Meds:   Current Facility-Administered Medications:  Acetaminophen - used x 1  650 mg Oral Q6H PRN    cholecalciferol 2,000 Units Oral Daily    dabigatran etexilate 150 mg Oral BID    famotidine 10 mg Oral Daily    FLUoxetine 40 mg Oral Daily    hydrALAZINE 5 mg Intravenous Q6H PRN    HYDROmorphone - used x 2 8/18 0 2 mg Intravenous Q4H PRN 0811  1235   HYDROmorphone 0 5 mg Intravenous Q4H PRN    metoprolol succinate 25 mg Oral Daily With Dinner    ondansetron 4 mg Intravenous Q6H PRN    pravastatin 40 mg Oral Daily With Dinner    sodium chloride 125 mL/hr Intravenous Continuous Last Rate: 125 mL/hr (08/18/19 0806)   tamsulosin 0 4 mg Oral Daily With Dinner        Discharge Plan: to be determined     Network Utilization Review Department  Phone: [phone]; Fax [phone]  [email or web address]  ATTENTION: Please call with any questions or concerns to [phone]  and carefully listen to the prompts so that you are directed to the right person  Send all requests for admission clinical reviews, approved or denied determinations and any other requests to fax [phone]   All voicemails are confidential

## 2019-08-18 NOTE — ASSESSMENT & PLAN NOTE
· POA, creatinine elevated at 1 58 today baseline appears to be approximately 1 0  · Likely postobstructive uropathy  · Continue IV fluids and monitor renal function  · Avoid hypotension, avoid nephrotoxins

## 2019-08-18 NOTE — PROGRESS NOTES
Progress Note - Jennifer Galeas 1950, 77 y o  male MRN: [id number]    Unit/Bed#: -01 Encounter: [id number]    Primary Care Provider: Marta Bentley MD   Date and time admitted to hospital: 8/17/2019  3:33 AM    * Ureterolithiasis  Assessment & Plan  · Presented from home with left flank pain  CT of the abdomen/pelvis revealed moderately obstructing 2 mm distal left ureteric calculus above the UV junction  · Patient has been conservatively managed with IV fluids and was started on Flomax  · This morning he reported persistent pain rated 4/10  No fever or chills  · A renal ultrasound showed mild left hydronephrosis  Left ureteral jet was not detected  · Urology evaluation obtained and the recommend continued observation another 24 hours  · Will re-evaluate in the morning to determine if need to proceed with ureteroscopy  · Strain all urine    RICHIE (acute kidney injury) ([facility] [address] )  Assessment & Plan  · POA, creatinine elevated at 1 58 today baseline appears to be approximately 1 0  · Likely postobstructive uropathy  · Continue IV fluids and monitor renal function  · Avoid hypotension, avoid nephrotoxins    Paroxysmal atrial fibrillation (HCC)  Assessment & Plan  · Remains in sinus rhythm  Continue Pradaxa, metoprolol    Essential hypertension  Assessment & Plan  · Blood pressure stable  Continue current medications  · Resume amlodipine      VTE Pharmacologic Prophylaxis:   Pharmacologic: Dabigatran (Pradaxa)  Mechanical VTE Prophylaxis in Place: Yes    Patient Centered Rounds: I have performed bedside rounds with nursing staff today  Discussions with Specialists or Other Care Team Provider:  Nursing    Education and Discussions with Family / Patient:  Patient and significant other updated at the bedside    All questions answered    Current Length of Stay: 0 day(s)    Current Patient Status: Inpatient   Certification Statement: The patient will continue to require additional inpatient hospital stay due to Above diagnosis and care plan    Discharge Plan:  Pending clinical improvement    Code Status: Level 1 - Full Code      Subjective:   Patient reports persistent left flank pain this morning rated as 4/10  Denies fever or chills, no nausea vomiting    Objective:     Vitals:   Temp (24hrs), Av 8 °F (36 6 °C), Min:97 3 °F (36 3 °C), Max:98 3 °F (36 8 °C)    Temp:  [97 3 °F (36 3 °C)-98 3 °F (36 8 °C)] 97 3 °F (36 3 °C)  HR:  [69-74] 69  Resp:  [16-18] 18  BP: (153-166)/(74-78) 153/77  SpO2:  [95 %-96 %] 96 %  Body mass index is 30 65 kg/m²  Input and Output Summary (last 24 hours):        Intake/Output Summary (Last 24 hours) at 2019 1443  Last data filed at 2019 1443  Gross per 24 hour   Intake 3411 25 ml   Output 4080 ml   Net -668 75 ml       Physical Exam:  General Appearance:    Alert, cooperative, no distress, appropriately responsive    Head:    Normocephalic, without obvious abnormality, atraumatic, mucous membranes moist    Eyes:    Conjunctiva/corneas clear, EOM's intact   Neck:   Supple   Lungs:     Clear to auscultation bilaterally, respirations unlabored, no crackles or wheeze     Heart:    Regular rate and rhythm, S1 and S2    Abdomen:     Soft, non-tender, bowel sounds active all four quadrants,     no masses, no organomegaly   Extremities:   Extremities normal, atraumatic, no cyanosis or edema   Neurologic:  nonfocal         Additional Data:     Labs:    Results from last 7 days   Lab Units 19  0458   WBC Thousand/uL 8 62   HEMOGLOBIN g/dL 12 5   HEMATOCRIT % 41 5   PLATELETS Thousands/uL 181   NEUTROS PCT % 70   LYMPHS PCT % 13*   MONOS PCT % 12   EOS PCT % 3     Results from last 7 days   Lab Units 19  0458 19  0350   POTASSIUM mmol/L 4 8 4 5   CHLORIDE mmol/L 109* 102   CO2 mmol/L 24 27   BUN mg/dL 25 26*   CREATININE mg/dL 1 58* 1 45*   CALCIUM mg/dL 7 9* 9 1   ALK PHOS U/L  --  70   ALT U/L  --  24   AST U/L  --  18           * I Have Reviewed All Lab Data Listed Above  * Additional Pertinent Lab Tests Reviewed: Deuce [facility] Admission Reviewed    Cultures:   Blood Culture:   Lab Results   Component Value Date    BLOODCX No Growth After 5 Days  05/19/2018    BLOODCX No Growth After 5 Days  05/19/2018     Urine Culture:   Lab Results   Component Value Date    URINECX <10,000 cfu/ml  08/17/2019     Sputum Culture: No components found for: SPUTUMCX  Wound Culture: No results found for: WOUNDCULT    Last 24 Hours Medication List:     Current Facility-Administered Medications:  acetaminophen 650 mg Oral Q6H PRN Leldon Orange, PA-C    amLODIPine 5 mg Oral Daily Abdiaziz Mcrae MD    cholecalciferol 2,000 Units Oral Daily Leldon [place], PA-C    dabigatran etexilate 150 mg Oral BID Leldon [place], PA-C    famotidine 10 mg Oral Daily Leldon [place], PA-C    FLUoxetine 40 mg Oral Daily Leldon Orange, PA-C    hydrALAZINE 5 mg Intravenous Q6H PRN Leldon Orange, PA-C    HYDROmorphone 0 2 mg Intravenous Q4H PRN Leldon Orange, PA-C    HYDROmorphone 0 5 mg Intravenous Q4H PRN Leldon Orange, PA-C    metoprolol succinate 25 mg Oral Daily With Nargis and Wesley, PA-C    ondansetron 4 mg Intravenous Q6H PRN Leldon Orange, PA-C    pravastatin 40 mg Oral Daily With Nargis and Wesley, PA-C    sodium chloride 125 mL/hr Intravenous Continuous Leldon Orange, PA-C Last Rate: 125 mL/hr (08/18/19 0806)   tamsulosin 0 4 mg Oral Daily With Dinner Leldon Orange, PA-C         Today, Patient Was Seen By: Abdiaziz Mcrae MD    ** Please Note: Dragon 360 Dictation voice to text software may have been used in the creation of this document   **

## 2019-08-18 NOTE — ASSESSMENT & PLAN NOTE
· Presented from home with left flank pain  CT of the abdomen/pelvis revealed moderately obstructing 2 mm distal left ureteric calculus above the UV junction  · Patient has been conservatively managed with IV fluids and was started on Flomax  · This morning he reported persistent pain rated 4/10  No fever or chills  · A renal ultrasound showed mild left hydronephrosis    Left ureteral jet was not detected  · Urology evaluation obtained and the recommend continued observation another 24 hours  · Will re-evaluate in the morning to determine if need to proceed with ureteroscopy  · Strain all urine

## 2019-08-19 ENCOUNTER — APPOINTMENT (INPATIENT)
Dept: ULTRASOUND IMAGING | Facility: HOSPITAL | Age: 69
DRG: 694 | End: 2019-08-19
Payer: COMMERCIAL

## 2019-08-19 LAB
ANION GAP SERPL CALCULATED.3IONS-SCNC: 8 MMOL/L (ref 4–13)
BASOPHILS # BLD AUTO: 0.03 THOUSANDS/ΜL (ref 0–0.1)
BASOPHILS NFR BLD AUTO: 0 % (ref 0–1)
BUN SERPL-MCNC: 22 MG/DL (ref 5–25)
CALCIUM SERPL-MCNC: 8.3 MG/DL (ref 8.3–10.1)
CHLORIDE SERPL-SCNC: 107 MMOL/L (ref 100–108)
CO2 SERPL-SCNC: 24 MMOL/L (ref 21–32)
CREAT SERPL-MCNC: 1.58 MG/DL (ref 0.6–1.3)
EOSINOPHIL # BLD AUTO: 0.26 THOUSAND/ΜL (ref 0–0.61)
EOSINOPHIL NFR BLD AUTO: 3 % (ref 0–6)
ERYTHROCYTE [DISTWIDTH] IN BLOOD BY AUTOMATED COUNT: 13.9 % (ref 11.6–15.1)
GFR SERPL CREATININE-BSD FRML MDRD: 44 ML/MIN/1.73SQ M
GLUCOSE SERPL-MCNC: 100 MG/DL (ref 65–140)
HCT VFR BLD AUTO: 39.5 % (ref 36.5–49.3)
HGB BLD-MCNC: 12.3 G/DL (ref 12–17)
IMM GRANULOCYTES # BLD AUTO: 0.07 THOUSAND/UL (ref 0–0.2)
IMM GRANULOCYTES NFR BLD AUTO: 1 % (ref 0–2)
LYMPHOCYTES # BLD AUTO: 1.08 THOUSANDS/ΜL (ref 0.6–4.47)
LYMPHOCYTES NFR BLD AUTO: 11 % (ref 14–44)
MCH RBC QN AUTO: 29.2 PG (ref 26.8–34.3)
MCHC RBC AUTO-ENTMCNC: 31.1 G/DL (ref 31.4–37.4)
MCV RBC AUTO: 94 FL (ref 82–98)
MONOCYTES # BLD AUTO: 1.1 THOUSAND/ΜL (ref 0.17–1.22)
MONOCYTES NFR BLD AUTO: 12 % (ref 4–12)
NEUTROPHILS # BLD AUTO: 7.01 THOUSANDS/ΜL (ref 1.85–7.62)
NEUTS SEG NFR BLD AUTO: 73 % (ref 43–75)
NRBC BLD AUTO-RTO: 0 /100 WBCS
PLATELET # BLD AUTO: 189 THOUSANDS/UL (ref 149–390)
PMV BLD AUTO: 10.7 FL (ref 8.9–12.7)
POTASSIUM SERPL-SCNC: 5 MMOL/L (ref 3.5–5.3)
RBC # BLD AUTO: 4.21 MILLION/UL (ref 3.88–5.62)
SODIUM SERPL-SCNC: 139 MMOL/L (ref 136–145)
WBC # BLD AUTO: 9.55 THOUSAND/UL (ref 4.31–10.16)

## 2019-08-19 PROCEDURE — 82360 CALCULUS ASSAY QUANT: CPT | Performed by: NURSE PRACTITIONER

## 2019-08-19 PROCEDURE — G0515 COGNITIVE SKILLS DEVELOPMENT: HCPCS

## 2019-08-19 PROCEDURE — 97166 OT EVAL MOD COMPLEX 45 MIN: CPT

## 2019-08-19 PROCEDURE — 99232 SBSQ HOSP IP/OBS MODERATE 35: CPT | Performed by: NURSE PRACTITIONER

## 2019-08-19 PROCEDURE — 99232 SBSQ HOSP IP/OBS MODERATE 35: CPT | Performed by: INTERNAL MEDICINE

## 2019-08-19 PROCEDURE — 85025 COMPLETE CBC W/AUTO DIFF WBC: CPT | Performed by: INTERNAL MEDICINE

## 2019-08-19 PROCEDURE — G8987 SELF CARE CURRENT STATUS: HCPCS

## 2019-08-19 PROCEDURE — 80048 BASIC METABOLIC PNL TOTAL CA: CPT | Performed by: INTERNAL MEDICINE

## 2019-08-19 PROCEDURE — 76770 US EXAM ABDO BACK WALL COMP: CPT

## 2019-08-19 PROCEDURE — G8988 SELF CARE GOAL STATUS: HCPCS

## 2019-08-19 RX ORDER — TAMSULOSIN HYDROCHLORIDE 0.4 MG/1
0.8 CAPSULE ORAL
Status: DISCONTINUED | OUTPATIENT
Start: 2019-08-19 | End: 2019-08-20 | Stop reason: HOSPADM

## 2019-08-19 RX ADMIN — METOPROLOL SUCCINATE 25 MG: 25 TABLET, EXTENDED RELEASE ORAL at 16:15

## 2019-08-19 RX ADMIN — VITAMIN D, TAB 1000IU (100/BT) 2000 UNITS: 25 TAB at 08:06

## 2019-08-19 RX ADMIN — DABIGATRAN ETEXILATE MESYLATE 150 MG: 150 CAPSULE ORAL at 20:55

## 2019-08-19 RX ADMIN — FAMOTIDINE 10 MG: 20 TABLET ORAL at 08:07

## 2019-08-19 RX ADMIN — SODIUM CHLORIDE 125 ML/HR: 0.9 INJECTION, SOLUTION INTRAVENOUS at 01:24

## 2019-08-19 RX ADMIN — PRAVASTATIN SODIUM 40 MG: 40 TABLET ORAL at 16:15

## 2019-08-19 RX ADMIN — SODIUM CHLORIDE 125 ML/HR: 0.9 INJECTION, SOLUTION INTRAVENOUS at 09:24

## 2019-08-19 RX ADMIN — TAMSULOSIN HYDROCHLORIDE 0.8 MG: 0.4 CAPSULE ORAL at 16:15

## 2019-08-19 RX ADMIN — SODIUM CHLORIDE 75 ML/HR: 0.9 INJECTION, SOLUTION INTRAVENOUS at 21:39

## 2019-08-19 RX ADMIN — AMLODIPINE BESYLATE 5 MG: 5 TABLET ORAL at 08:06

## 2019-08-19 RX ADMIN — DABIGATRAN ETEXILATE MESYLATE 150 MG: 150 CAPSULE ORAL at 08:06

## 2019-08-19 RX ADMIN — FLUOXETINE HYDROCHLORIDE 40 MG: 20 CAPSULE ORAL at 08:06

## 2019-08-19 NOTE — SOCIAL WORK
LOS 1  Patient is not a bundle or a readmission  CM spoke with patient at the bedside  Patient lives in [place] with his friend in a bilevel home  Patient's bedroom and bathroom are on the second floor  There are 2 LEANDRO from the front and 3 LEANDRO the back  Patient uses a roller walker at home  Patient has a stool that he uses in the bathtub  Patient is independent with bathing and dressing  Patient has assistance with meals and cleaning  Patient has a history of VNA with [facility]  Patient has a history of inpatient rehab with [address] [place] [address]  [facility] in [place] for prescriptions  Patient has prescription insurance and is able to afford his medications  [facility]  Patient has an advance directive/living will  Copy not on file  Patient's sister Philomena Benz is his POA  Her number is   Patient stated his friend Hanane Salgureo can also be contacted at [phone]  Patient is retired and currently drives  Patient's PCP is Dr Collin Hwang  CM discussed with patient at the bedside re:WARD  Patient stated he was recently at [address] [place] [address] for inpatient rehab  Patient's goal is to return home safely  Patient requesting CM speak with his friend Hanane Salguero  CM will f/u with patient and friend re:WARD  All questions answered at the bedside  CM reviewed discharge planning process including the following: identifying caregivers at home, preference for d/c planning needs,   availability of [facility]s to [facility] program, availability of treatment team to discuss questions or concerns patient and/or family may have regarding diagnosis, plan of care, old or new medications and discharge planning   CM will continue to follow for care coordination and update assessment as appropriate

## 2019-08-19 NOTE — PROGRESS NOTES
Progress Note - Alex Moy 77 y o  male MRN: [id number]    Unit/Bed#: -01 Encounter: [id number]      Assessment:  Alex Moy is a pleasant 62-year-old male with urologic history of adenocarcinoma of the prostate, known to [facility] status post radical prostatectomy;  transferred from rehab secondary to left flank pain  CT confirmed approximately 2 mm left obstructing ureteral calculus  Patient was admitted for medical expulsive therapy and monitoring of lab work  Patient has remained afebrile, hemodynamically stable and has not required pain medication since earlier today  Nursing staff have strained urine and encountered a small hard object, possibly stone  Plan:  Patient is uncertain of how he is feeling, but states he no longer has pain  Sent stone for analysis  Obtain stat ultrasound for re-evaluation  Discharge per Internal Medicine and follow-up with [facility]  Subjective:  Denies fever, chills, flank, abdominal, suprapubic, groin or testicular pain      Objective:     Vitals: Blood pressure 160/71, pulse 72, temperature 97 7 °F (36 5 °C), temperature source Oral, resp  rate 20, height 5' 10" (1 778 m), weight 96 9 kg (213 lb 10 oz), SpO2 95 %  ,Body mass index is 30 65 kg/m²        Intake/Output Summary (Last 24 hours) at 8/19/2019 1423  Last data filed at 8/19/2019 1400  Gross per 24 hour   Intake 4143 75 ml   Output 6705 ml   Net -2561 25 ml       Physical Exam: General appearance: alert and oriented, in no acute distress, appears stated age, cooperative and no distress  Head: Normocephalic, without obvious abnormality, atraumatic  Neck: no adenopathy, no carotid bruit, no JVD, supple, symmetrical, trachea midline and thyroid not enlarged, symmetric, no tenderness/mass/nodules  Lungs: diminished breath sounds  Heart: regular rate and rhythm, S1, S2 normal, no murmur, click, rub or gallop  Abdomen: soft, non-tender; bowel sounds normal; no masses,  no organomegaly  Extremities: extremities normal, warm and well-perfused; no cyanosis, clubbing, or edema  Pulses: 2+ and symmetric  Neurologic: Grossly normal  No urinary drains     Invasive Devices     Peripheral Intravenous Line            Peripheral IV 08/17/19 Left Antecubital 2 days              Lab Results   Component Value Date    WBC 9 55 08/19/2019    HGB 12 3 08/19/2019    HCT 39 5 08/19/2019    MCV 94 08/19/2019     08/19/2019     Lab Results   Component Value Date    SODIUM 139 08/19/2019    K 5 0 08/19/2019     08/19/2019    CO2 24 08/19/2019    BUN 22 08/19/2019    CREATININE 1 58 (H) 08/19/2019    GLUC 100 08/19/2019    CALCIUM 8 3 08/19/2019       Lab, Imaging and other studies: I have personally reviewed pertinent reports

## 2019-08-19 NOTE — PROGRESS NOTES
Progress Note - [facility] 1950, 77 y o  male MRN: [id number]    Unit/Bed#: -01 Encounter: [id number]    Primary Care Provider: Julio Cervantes MD   Date and time admitted to hospital: 8/17/2019  3:33 AM    * Ureterolithiasis  Assessment & Plan  · Presented from home with left flank pain  CT of the abdomen/pelvis revealed moderately obstructing 2 mm distal left ureteric calculus above the UV junction  · Patient has been conservatively managed with IV fluids and was started on Flomax  · He continues to report left flank pain although improved today  · A renal ultrasound showed mild left hydronephrosis  Left ureteral jet was not detected  · Await further Urology eval today and recommendations  · Continue to strain all urine    RICHIE (acute kidney injury) ([facility] [address] )  Assessment & Plan  · POA, creatinine remains elevated at 1 58 today baseline appears to be approximately 1 0  · Suspected postobstructive uropathy  · Continue IV fluids and monitor renal function  · Avoid hypotension, avoid nephrotoxins    Paroxysmal atrial fibrillation (HCC)  Assessment & Plan  · Remains in sinus rhythm  Continue Pradaxa, metoprolol    Essential hypertension  Assessment & Plan  · Blood pressure stable  Continue current medications      VTE Pharmacologic Prophylaxis:   Pharmacologic: Dabigatran (Pradaxa)  Mechanical VTE Prophylaxis in Place: Yes    Patient Centered Rounds: I have performed bedside rounds with nursing staff today      Discussions with Specialists or Other Care Team Provider:  Nursing/    Education and Discussions with Family / Patient:  Patient    Current Length of Stay: 1 day(s)    Current Patient Status: Inpatient   Certification Statement: The patient will continue to require additional inpatient hospital stay due to Above diagnosis and care plan    Discharge Plan:  Pending improvement in renal function and Urology clearance    Code Status: Level 1 - Full Code      Subjective:   No new complaints or acute overnight events  He reports persistent left flank pain although improved this morning  Denies any fever or chills, no nausea or vomiting  Objective:     Vitals:   Temp (24hrs), Av 9 °F (36 6 °C), Min:97 7 °F (36 5 °C), Max:98 2 °F (36 8 °C)    Temp:  [97 7 °F (36 5 °C)-98 2 °F (36 8 °C)] 97 7 °F (36 5 °C)  HR:  [68-72] 72  Resp:  [16-21] 20  BP: (115-160)/(62-93) 160/71  SpO2:  [94 %-95 %] 95 %  Body mass index is 30 65 kg/m²  Input and Output Summary (last 24 hours): Intake/Output Summary (Last 24 hours) at 2019 1212  Last data filed at 2019 1159  Gross per 24 hour   Intake 4143 75 ml   Output 6280 ml   Net -2136 25 ml       Physical Exam:  General Appearance:    Alert, cooperative, no distress, appropriately responsive    Head:    Normocephalic, without obvious abnormality, atraumatic, mucous membranes moist    Eyes:    Conjunctiva/corneas clear, EOM's intact   Neck:   Supple   Lungs:     Clear to auscultation bilaterally, respirations unlabored, no crackles or wheeze     Heart:    Regular rate and rhythm, S1 and S2    Abdomen:     Soft, non-tender, bowel sounds active all four quadrants,     no masses, no organomegaly   Extremities:   Extremities normal, atraumatic, no cyanosis or edema   Neurologic:  nonfocal         Additional Data:     Labs:    Results from last 7 days   Lab Units 19  0429   WBC Thousand/uL 9 55   HEMOGLOBIN g/dL 12 3   HEMATOCRIT % 39 5   PLATELETS Thousands/uL 189   NEUTROS PCT % 73   LYMPHS PCT % 11*   MONOS PCT % 12   EOS PCT % 3     Results from last 7 days   Lab Units 19  0429  19  0350   POTASSIUM mmol/L 5 0   < > 4 5   CHLORIDE mmol/L 107   < > 102   CO2 mmol/L 24   < > 27   BUN mg/dL 22   < > 26*   CREATININE mg/dL 1 58*   < > 1 45*   CALCIUM mg/dL 8 3   < > 9 1   ALK PHOS U/L  --   --  70   ALT U/L  --   --  24   AST U/L  --   --  18    < > = values in this interval not displayed             * I Have Reviewed All Lab Data Listed Above  * Additional Pertinent Lab Tests Reviewed: Deuce 66 Admission Reviewed    Cultures:   Blood Culture:   Lab Results   Component Value Date    BLOODCX No Growth After 5 Days  05/19/2018    BLOODCX No Growth After 5 Days  05/19/2018     Urine Culture:   Lab Results   Component Value Date    URINECX <10,000 cfu/ml  08/17/2019     Sputum Culture: No components found for: SPUTUMCX  Wound Culture: No results found for: WOUNDCULT    Last 24 Hours Medication List:     Current Facility-Administered Medications:  acetaminophen 650 mg Oral Q6H PRN Jan Apt, PA-C    amLODIPine 5 mg Oral Daily Rashad Montalvo MD    cholecalciferol 2,000 Units Oral Daily Jan Apt, PA-C    dabigatran etexilate 150 mg Oral BID Jan Apt, PA-C    famotidine 10 mg Oral Daily Jan Apt, PA-C    FLUoxetine 40 mg Oral Daily Jan Apt, PA-C    hydrALAZINE 5 mg Intravenous Q6H PRN Jan Apt, PA-C    HYDROmorphone 0 2 mg Intravenous Q4H PRN Jan Apt, PA-C    HYDROmorphone 0 5 mg Intravenous Q4H PRN Jan Apt, PA-C    metoprolol succinate 25 mg Oral Daily With DAISY Campbell-PETR    ondansetron 4 mg Intravenous Q6H PRN Jan Apt, PA-C    pravastatin 40 mg Oral Daily With Shannan, PA-PETR    sodium chloride 75 mL/hr Intravenous Continuous Rashad Montalvo MD Last Rate: 125 mL/hr (08/19/19 0924)   tamsulosin 0 4 mg Oral Daily With Dinner Jan Apt, PA-C         Today, Patient Was Seen By: Rashad Montalvo MD    ** Please Note: Dragon 360 Dictation voice to text software may have been used in the creation of this document   **

## 2019-08-19 NOTE — PLAN OF CARE
Problem: OCCUPATIONAL THERAPY ADULT  Goal: Performs self-care activities at highest level of function for planned discharge setting  See evaluation for individualized goals  Description  Treatment Interventions: Continued evaluation, Activityengagement, ADL retraining, Functional transfer training, Compensatory technique education          See flowsheet documentation for full assessment, interventions and recommendations  Note:   Limitation: Decreased ADL status, Decreased endurance, Decreased high-level ADLs     Assessment: Pt is a 77 y o  male seen for OT evaluation ([id number]) s/p admit [facility] on 8/17/2019 w/ Ureterolithiasis  Comorbidities affecting pt's functional performance at time of assessment include: HLD, HTN, GERD, RICHIE, depression, a-fib  Personal factors affecting pt at time of IE include:steps to enter environment and health management   Prior to admission, pt was independent with ADLs, IADLs, and Mod I with functional mobility using RW  Upon evaluation: Pt requires supervision for all ADLs, sit <> stand transfers, toilet transfers, and functional mobility using RW <> bathroom 2* the following deficits impacting occupational performance: decreased balance, decreased tolerance, decreased safety awareness and decreased coping skills  Cognition appears to be [facility]  Vision is [facility]  Evaluation required an expanded review of medical and/or therapy records and additional review of physical, cognitive, or psychosocial history related to current functional performance    Through skilled assessment, identified 3-5 performance deficits (i e , relating to physical cognitive or psychosocial skills) that result in activity limitations and/or participating restrictions  Development of pt POC requires clinical decision making of moderate analytic complexity  Patient presents with comorbidities that affect occupational performance   Minimal to moderate modification of tasks or assistance (e g , physical or verbal) is necessary to enable completion of evaluation component    Pt to benefit from continued skilled OT tx while in the hospital to address deficits as defined above and maximize level of functional independence w ADL's and functional mobility  Occupational Performance areas to address include: grooming, bathing/shower, toilet hygiene, dressing, health maintenance, functional mobility and household maintenance  From OT standpoint, recommendation at time of d/c would be home with family support pending interdisciplinary recommendations  Tx assessment: Patient participated in Skilled OT session (time 6172-7049) this date with interventions consisting of continued cognitive assessment  Performed short blessed test in order to screen for memory and attention deficits in need of further evaluation  Pt scored 2/28 indicating normal cognition  Pt made 1 error when counting down from 20 to 1  Pt required 3 trials in order to  Patient continues to be functioning below baseline level, occupational performance remains limited secondary to factors listed above and increased risk for falls and injury  From OT standpoint, recommendation at time of d/c would be home with family support  Patient to benefit from continued Occupational Therapy treatment while in the hospital to address deficits as defined above and maximize level of functional independence with ADLs and functional mobility        OT Discharge Recommendation: Home with family support(pending interdisciplinary recommendations)  OT - OK to Discharge: (once medically cleared with family support)

## 2019-08-19 NOTE — ASSESSMENT & PLAN NOTE
· POA, creatinine remains elevated at 1 58 today baseline appears to be approximately 1 0  · Suspected postobstructive uropathy  · Continue IV fluids and monitor renal function  · Avoid hypotension, avoid nephrotoxins

## 2019-08-19 NOTE — OCCUPATIONAL THERAPY NOTE
OccupationalTherapy Evaluation and Treatment Note     Patient Name: Jason Gabriel  GAPKK'X Date: 8/19/2019  Problem List  Patient Active Problem List   Diagnosis    Ureterolithiasis    Hyperlipidemia    Essential hypertension    GERD (gastroesophageal reflux disease)    [facility]    Depression    Paroxysmal atrial fibrillation (HCC)     Past Medical History  Past Medical History:   Diagnosis Date    Anxiety     Depression     Hyperlipidemia     Hypertension      Past Surgical History  Past Surgical History:   Procedure Laterality Date    KNEE ARTHROSCOPY Left     SHOULDER ARTHROSCOPY Right          08/19/19 7961   Note Type   Note type Eval/Treat   Restrictions/Precautions   Weight Bearing Precautions Per Order No   Other Precautions Fall Risk;Multiple lines   Pain Assessment   Pain Assessment No/denies pain   Pain Score No Pain   Home Living   Type of [address] Two level;Stairs to enter with rails  (pt reports 4 LEANDRO with single rail)   [address] [place] chair   Additional Comments Per CM note this admission: Patient lives in [place] with his friend in a bilevel home  Patient's bedroom and bathroom are on the second floor  There are 2 LEANDRO from the front and 3 LEANDRO the back  Patient uses a roller walker at home  Patient has a stool that he uses in the bathtub  Patient is independent with bathing and dressing  Patient has assistance with meals and cleaning  Prior Function   Level of [place] Independent with ADLs and functional mobility; Needs assistance with IADLs   Lives With Significant other  (girlfriend Wan Rachel)   [address] 69 Help From Other (Comment)  Severo Albert)   ADL Assistance Independent   IADLs Independent   Falls in the last 6 months 0   Vocational Retired   Comments Pt drives, however typically Dolph Curl drives  Wan Ramos completes laundry  Pt and Dolph Curl complete cleaning and cooking (pt sits down and completes tasks)  Pt manages his own medications     Lifestyle Autonomy Pt is I with ADLs and shares IADLs with Sylvia Zamudio   Reciprocal Relationships Pt has supportive girlfriend Sylvia Zamudio   Service to Others Pt is retired meet nino   Intrinsic Gratification Pt enjoys spending time with girlfriend Sylvia Zamudio; going out to mall   Psychosocial   Psychosocial (WDL) WDL   Subjective   Subjective Pt is pleasant and cooperative   ADL   Grooming Assistance 5  Supervision/Setup   Grooming Deficit Other (Comment)  (simulated)   UB Bathing Assistance 5  Supervision/Setup   UB Bathing Deficit Other (Comment)  (simulated)   LB Bathing Assistance 5  Supervision/Setup   LB Bathing Deficit Other (Comment)  (simulated)   UB Dressing Assistance 5  Supervision/Setup   UB Dressing Deficit Other (Comment)  (simulated)   LB Dressing Assistance 5  Supervision/Setup   LB Dressing Deficit Supervision/safety   Toileting Assistance  5  Supervision/Setup   Toileting Deficit Supervison/safety   Bed Mobility   Additional Comments Pt seated in b/s chair at start of session and agreeable to OT  Pt seated in b/s chair at end of session with all needs met and call bell within reach  Transfers   Sit to Stand 5  Supervision   Additional items Assist x 1; Increased time required;Armrests   Stand to Sit 5  Supervision   Additional items Assist x 1; Increased time required;Armrests  (VCs for safety with RW and IV pole)   Toilet transfer 5  Supervision   Additional items Assist x 1; Increased time required;Standard toilet   Additional Comments no overt LOB during all transfers   Functional Mobility   Functional Mobility 5  Supervision   Additional Comments Ax1 using RW <> bathroom;  A to manage IV pole; no overt LOB noted   Balance   Static Sitting Good   Dynamic Sitting Good   Static Standing Fair +   Ambulatory Fair +   Activity Tolerance   Activity Tolerance Patient tolerated treatment well   Nurse Made Aware REINA Waters Assessment   RUE Assessment X   RUE Overall AROM   R Shoulder Flexion WFL   R Elbow Flexion WFL   R Wrist Flexion WFL   R Mass Grasp WFL   RUE Strength   RUE Overall Strength Within Functional Limits - strength 5/5   LUE Assessment   LUE Assessment X   LUE Overall AROM   L Shoulder Flexion WFL   L Elbow Flexion WFL   L Wrist Flexion WFL   L Mass Grasp WFL   LUE Strength   LUE Overall Strength Within Functional Limits - strength 5/5   Hand Function   Gross Motor Coordination Functional   Fine Motor Coordination Functional   Sensation   Light Touch No apparent deficits   Vision-Basic Assessment   Current Vision Wears glasses all the time   Patient Visual Report Other (Comment)  (reports no visual changes at this time)   Vision - Complex Assessment   Acuity Able to read clock/calendar on wall without difficulty   Cognition   Overall Cognitive Status [facility]   Arousal/Participation Alert; Responsive;Arousable; Cooperative   Attention Attends with cues to redirect   Orientation Level Oriented X4   Memory Within functional limits   Following Commands Follows one step commands with increased time or repetition  (required repetition of command for finger to nose test)   Comments Pt able to identify self by full name and birthdate  Assessment   Limitation Decreased ADL status; Decreased endurance;Decreased high-level ADLs   Assessment Pt is a 77 y o  male seen for OT evaluation ([id number]) s/p admit [facility] on 8/17/2019 w/ Ureterolithiasis  Comorbidities affecting pt's functional performance at time of assessment include: HLD, HTN, GERD, RICHIE, depression, a-fib  Personal factors affecting pt at time of IE include:steps to enter environment and health management   Prior to admission, pt was independent with ADLs, IADLs, and Mod I with functional mobility using RW   Upon evaluation: Pt requires supervision for all ADLs, sit <> stand transfers, toilet transfers, and functional mobility using RW <> bathroom 2* the following deficits impacting occupational performance: decreased balance, decreased tolerance, decreased safety awareness and decreased coping skills  Cognition appears to be [facility]  Vision is [facility]  Evaluation required an expanded review of medical and/or therapy records and additional review of physical, cognitive, or psychosocial history related to current functional performance    Through skilled assessment, identified 3-5 performance deficits (i e , relating to physical cognitive or psychosocial skills) that result in activity limitations and/or participating restrictions  Development of pt POC requires clinical decision making of moderate analytic complexity  Patient presents with comorbidities that affect occupational performance  Minimal to moderate modification of tasks or assistance (e g , physical or verbal) is necessary to enable completion of evaluation component    Pt to benefit from continued skilled OT tx while in the hospital to address deficits as defined above and maximize level of functional independence w ADL's and functional mobility  Occupational Performance areas to address include: grooming, bathing/shower, toilet hygiene, dressing, health maintenance, functional mobility and household maintenance  From OT standpoint, recommendation at time of d/c would be home with family support pending interdisciplinary recommendations     Goals   Patient Goals "home sweet home"   Short Term Goal #1 Pt will identify and implement at least 3 coping strategies into daily routine in order to decrease risk for readmission and increase participation in healthy routines   Functional Transfer Goals   Pt Will Perform All Functional Transfers With mod indep   Pt Will Transfer To Toilet With mod indep   Pt Will Transfer To Shower With mod indep   ADL Goals   Pt Will Perform Grooming With mod indep   Pt Will Perform Bathing With mod indep   Pt Will Perform UE Dressing With mod indep   Pt Will Perform LE Dressing With mod indep   Pt Will Perform Toileting With mod indep   Plan   Treatment Interventions Continued evaluation; Activityengagement;ADL retraining;Functional transfer training; Compensatory technique education   Goal Expiration Date 08/22/19   Treatment Day 1   OT Frequency 1-2x/wk  (follow up if needed)   Additional Treatment Session   Start Time 4140   End Time 7214   Treatment Assessment Patient participated in Skilled OT session (time 0060-8419) this date with interventions consisting of continued cognitive assessment  Performed short blessed test in order to screen for memory and attention deficits in need of further evaluation  Pt scored 2/28 indicating normal cognition  Pt made 1 error when counting down from 20 to 1  Pt required 3 trials in order to  Patient continues to be functioning below baseline level, occupational performance remains limited secondary to factors listed above and increased risk for falls and injury  From OT standpoint, recommendation at time of d/c would be home with family support  Patient to benefit from continued Occupational Therapy treatment while in the hospital to address deficits as defined above and maximize level of functional independence with ADLs and functional mobility      Recommendation   OT Discharge Recommendation Home with family support  (pending interdisciplinary recommendations)   OT - OK to Discharge   (once medically cleared with family support)   Barthel Index   Feeding 10   Bathing 0   Grooming Score 5   Dressing Score 10   Bladder Score 10   Bowels Score 10   Toilet Use Score 10   Transfers (Bed/Chair) Score 10   Mobility (Level Surface) Score 0   Stairs Score 0   Barthel Index Score 65   Modified Whiteside Scale   Modified Olegario Scale 3   Magdalena Buck, OTR/L

## 2019-08-19 NOTE — SOCIAL WORK
CM spoke with patient and friend at the bedside  Patient and friend Kenny Cortes updated that [facility] is able to accept patient at discharge  Patient and Kenny Cortes updated that a new PT/OT evaluation would need to be done prior to discharge  Insurance authorization would also need to initiated to return for inpatient rehab  Patient and family requesting a PT eval prior to discharge  CM updated PT/OT and SLIM with plan of care  Nursing updated

## 2019-08-19 NOTE — ASSESSMENT & PLAN NOTE
· Presented from home with left flank pain  CT of the abdomen/pelvis revealed moderately obstructing 2 mm distal left ureteric calculus above the UV junction  · Patient has been conservatively managed with IV fluids and was started on Flomax  · He continues to report left flank pain although improved today  · A renal ultrasound showed mild left hydronephrosis    Left ureteral jet was not detected  · Await further Urology eval today and recommendations  · Continue to strain all urine

## 2019-08-19 NOTE — PLAN OF CARE
Problem: DISCHARGE PLANNING - CARE MANAGEMENT  Goal: Discharge to post-acute care or home with appropriate resources  Description  INTERVENTIONS:  - Conduct assessment to determine patient/family and health care team treatment goals, and need for post-acute services based on payer coverage, community resources, and patient preferences, and barriers to discharge  - Address psychosocial, clinical, and financial barriers to discharge as identified in assessment in conjunction with the patient/family and health care team  - Arrange appropriate level of post-acute services according to patients   needs and preference and payer coverage in collaboration with the physician and health care team  - Communicate with and update the patient/family, physician, and health care team regarding progress on the discharge plan  - Arrange appropriate transportation to post-acute venues  Outcome: Progressing  Note:   LOS 1  Patient is not a bundle or a readmission  CM spoke with patient at the bedside  Patient lives in [place] with his friend in a bilevel home  Patient's bedroom and bathroom are on the second floor  There are 2 LEANDRO from the front and 3 LEANDRO the back  Patient uses a roller walker at home  Patient has a stool that he uses in the bathtub  Patient is independent with bathing and dressing  Patient has assistance with meals and cleaning  Patient has a history of V[facility] with [facility]  Patient has a history of inpatient rehab with [facility]  Patient uses [facility] pharmacy in [place] for prescriptions  Patient has prescription insurance and is able to afford his medications  [facility] also [facility]  Patient has an advance directive/living will  Copy not on file  Patient's sister Doretha Jackson is his POA  Her number is   Patient stated his friend Jessica Avila can also be contacted at [phone]  Patient is retired and currently drives  Patient's PCP is Dr Kj Ortiz CM discussed with patient at the bedside re:[facility]  Patient stated he was recently at [facility] for inpatient rehab  Patient's goal is to return home safely  Patient requesting CM speak with his friend Radha Albright  CM will f/u with patient and friend re:WARD  All questions answered at the bedside  CM reviewed discharge planning process including the following: identifying caregivers at home, preference for d/c planning needs,   availability of [facility] program, availability of treatment team to discuss questions or concerns patient and/or family may have regarding diagnosis, plan of care, old or new medications and discharge planning   CM will continue to follow for care coordination and update assessment as appropriate

## 2019-08-19 NOTE — PLAN OF CARE
Problem: Potential for Falls  Goal: Patient will remain free of falls  Description  INTERVENTIONS:  - Assess patient frequently for physical needs  -  Identify cognitive and physical deficits and behaviors that affect risk of falls    -  [place] fall precautions as indicated by assessment   - Educate patient/family on patient safety including physical limitations  - Instruct patient to call for assistance with activity based on assessment  - Modify environment to reduce risk of injury  - Consider OT/PT consult to assist with strengthening/mobility  Outcome: Progressing     Problem: PAIN - ADULT  Goal: Verbalizes/displays adequate comfort level or baseline comfort level  Description  Interventions:  - Encourage patient to monitor pain and request assistance  - Assess pain using appropriate pain scale  - Administer analgesics based on type and severity of pain and evaluate response  - Implement non-pharmacological measures as appropriate and evaluate response  - Consider cultural and social influences on pain and pain management  - Notify physician/advanced practitioner if interventions unsuccessful or patient reports new pain  Outcome: Progressing     Problem: GASTROINTESTINAL - ADULT  Goal: Minimal or absence of nausea and/or vomiting  Description  INTERVENTIONS:  - Administer IV fluids if ordered to ensure adequate hydration  - Maintain NPO status until nausea and vomiting are resolved  - Nasogastric tube if ordered  - Administer ordered antiemetic medications as needed  - Provide nonpharmacologic comfort measures as appropriate  - Advance diet as tolerated, if ordered  - Consider nutrition services referral to assist patient with adequate nutrition and appropriate food choices  Outcome: Progressing     Problem: GENITOURINARY - ADULT  Goal: Maintains or returns to baseline urinary function  Description  INTERVENTIONS:  - Assess urinary function  - Encourage oral fluids to ensure adequate hydration if ordered  - Administer IV fluids as ordered to ensure adequate hydration  - Administer ordered medications as needed  - Offer frequent toileting  - Follow urinary retention protocol if ordered  Outcome: Progressing     Problem: METABOLIC, FLUID AND ELECTROLYTES - ADULT  Goal: Electrolytes maintained within normal limits  Description  INTERVENTIONS:  - Monitor labs and assess patient for signs and symptoms of electrolyte imbalances  - Administer electrolyte replacement as ordered  - Monitor response to electrolyte replacements, including repeat lab results as appropriate  - Instruct patient on fluid and nutrition as appropriate  Outcome: Progressing  Goal: Fluid balance maintained  Description  INTERVENTIONS:  - Monitor labs   - Monitor I/O and WT  - Instruct patient on fluid and nutrition as appropriate  - Assess for signs & symptoms of volume excess or deficit  Outcome: Progressing

## 2019-08-20 VITALS
SYSTOLIC BLOOD PRESSURE: 174 MMHG | BODY MASS INDEX: 30.58 KG/M2 | WEIGHT: 213.63 LBS | DIASTOLIC BLOOD PRESSURE: 83 MMHG | HEIGHT: 70 IN | RESPIRATION RATE: 18 BRPM | HEART RATE: 82 BPM | OXYGEN SATURATION: 95 % | TEMPERATURE: 97.5 F

## 2019-08-20 LAB
ANION GAP SERPL CALCULATED.3IONS-SCNC: 9 MMOL/L (ref 4–13)
BUN SERPL-MCNC: 15 MG/DL (ref 5–25)
CALCIUM SERPL-MCNC: 8.2 MG/DL (ref 8.3–10.1)
CHLORIDE SERPL-SCNC: 109 MMOL/L (ref 100–108)
CO2 SERPL-SCNC: 25 MMOL/L (ref 21–32)
CREAT SERPL-MCNC: 0.93 MG/DL (ref 0.6–1.3)
GFR SERPL CREATININE-BSD FRML MDRD: 84 ML/MIN/1.73SQ M
GLUCOSE SERPL-MCNC: 89 MG/DL (ref 65–140)
POTASSIUM SERPL-SCNC: 4.5 MMOL/L (ref 3.5–5.3)
SODIUM SERPL-SCNC: 143 MMOL/L (ref 136–145)

## 2019-08-20 PROCEDURE — 97116 GAIT TRAINING THERAPY: CPT

## 2019-08-20 PROCEDURE — G8978 MOBILITY CURRENT STATUS: HCPCS

## 2019-08-20 PROCEDURE — 97163 PT EVAL HIGH COMPLEX 45 MIN: CPT

## 2019-08-20 PROCEDURE — 99239 HOSP IP/OBS DSCHRG MGMT >30: CPT | Performed by: INTERNAL MEDICINE

## 2019-08-20 PROCEDURE — G8979 MOBILITY GOAL STATUS: HCPCS

## 2019-08-20 PROCEDURE — 80048 BASIC METABOLIC PNL TOTAL CA: CPT | Performed by: INTERNAL MEDICINE

## 2019-08-20 RX ADMIN — FAMOTIDINE 10 MG: 20 TABLET ORAL at 08:39

## 2019-08-20 RX ADMIN — DABIGATRAN ETEXILATE MESYLATE 150 MG: 150 CAPSULE ORAL at 08:40

## 2019-08-20 RX ADMIN — VITAMIN D, TAB 1000IU (100/BT) 2000 UNITS: 25 TAB at 08:39

## 2019-08-20 RX ADMIN — AMLODIPINE BESYLATE 5 MG: 5 TABLET ORAL at 08:40

## 2019-08-20 RX ADMIN — FLUOXETINE HYDROCHLORIDE 40 MG: 20 CAPSULE ORAL at 08:39

## 2019-08-20 NOTE — DISCHARGE SUMMARY
Discharge- Suleman Birmingham 1950, 77 y o  male MRN: [id number]    Unit/Bed#: -01 Encounter: [id number]    Primary Care Provider: Antoine Terry MD   Date and time admitted to hospital: 8/17/2019  3:33 AM      Discharging Physician / Practitioner: Juan Gallagher MD  PCP: Antoine Terry MD  Admission Date:   Admission Orders (From admission, onward)     Ordered        08/18/19 1021  Inpatient Admission  Once         08/17/19 0539  Place in Observation  Once                   Discharge Date: 08/20/19    Resolved Problems  Date Reviewed: 8/20/2019    None          Consultations During Hospital Stay:  ·  Urology    Procedures Performed:   ·  None    Significant Findings / Test Results:   Us Retroperitoneal Complete    Result Date: 8/18/2019  Impression: Mild left hydronephrosis  Left ureteral jet not detected  Workstation performed: [id number]     Ct Renal Stone Study Abdomen Pelvis Without Contrast    Result Date: 8/17/2019  Impression: 1  Moderately obstructing 2 mm distal left ureteric calculus, above the ureterovesical junction  2   Moderate-sized hiatal hernia  The study was marked in [facility] for immediate notification  Workstation performed: [id number]     Us Kidney And Bladder    Result Date: 8/19/2019  Impression: Unremarkable exam given limitations  No hydronephrosis  Workstation performed: [id number]     Incidental Findings:   ·  none     Test Results Pending at Discharge (will require follow up): ·  Stone analysis pending       Complications:   none     Reason for Admission:  Left flank pain    Hospital Course:     Suleman Birmingham is a 77 y o  male patient who originally presented to the hospital on 8/17/2019 due to  Left flank pain  Patient had a CT scan done in the emergency department which showed a moderately obstructed 2 mm stone distal to the left ureter  He was also noted to have acute kidney injury which was thought to be likely post obstructive    He was started on IV fluids as well as Flomax  He spontaneously passed the stone overnight without urologic intervention  On day of discharge,  He reported no pain  Renal function returned back to normal with IV hydration  Patient was discharged in stable condition with follow-up with [facility]    Please see above list of diagnoses and related plan for additional information  Condition at Discharge: good     Discharge Day Visit / Exam:     Subjective:  Patient seen examined this morning  No acute events overnight  Denies any pain  Denies any hematuria  Remains afebrile  Vitals: Blood Pressure: (!) 174/83 (08/20/19 0700)  Pulse: 82 (08/20/19 0700)  Temperature: 97 5 °F (36 4 °C) (08/20/19 0700)  Temp Source: Oral (08/20/19 0700)  Respirations: 18 (08/20/19 0700)  Height: 5' 10" (177 8 cm) (08/17/19 0600)  Weight - Scale: 96 9 kg (213 lb 10 oz) (08/17/19 0600)  SpO2: 95 % (08/20/19 0700)  Exam:   Physical Exam   Constitutional: He is oriented to person, place, and time  He appears well-developed and well-nourished  No distress  HENT:   Head: Normocephalic and atraumatic  Eyes: Pupils are equal, round, and reactive to light  Neck: No JVD present  Cardiovascular: Normal rate and regular rhythm  Exam reveals no friction rub  No murmur heard  Pulmonary/Chest: Effort normal  No stridor  No respiratory distress  He has no wheezes  Abdominal: Soft  He exhibits no distension  There is no tenderness  Musculoskeletal: Normal range of motion  He exhibits no edema  Neurological: He is alert and oriented to person, place, and time  No cranial nerve deficit  Skin: Skin is warm and dry  He is not diaphoretic  Nursing note and vitals reviewed  Discussion with Family:  Yes with patient's wife at bedside    Discharge instructions/Information to patient and family:   See after visit summary for information provided to patient and family        Provisions for Follow-Up Care:  See after visit summary for information related to follow-up care and any pertinent home health orders  Disposition:     Home    For Discharges to [facility] SNF:   · Not Applicable to this Patient - Not Applicable to this Patient    Planned Readmission: None     Discharge Statement:  I spent 40 minutes discharging the patient  This time was spent on the day of discharge  I had direct contact with the patient on the day of discharge  Greater than 50% of the total time was spent examining patient, answering all patient questions, arranging and discussing plan of care with patient as well as directly providing post-discharge instructions  Additional time then spent on discharge activities  Discharge Medications:  See after visit summary for reconciled discharge medications provided to patient and family        ** Please Note: This note has been constructed using a voice recognition system **

## 2019-08-20 NOTE — SOCIAL WORK
Physical therapist, Sofía Chacon discussed DC recomendations with BROCK GUTIÉRREZ then met with pt and PT at bedside  Pt's friend, Emiliano Contreras was also present at bedside  Pt's friend, Emiliano Contreras lives with pt in his home  Physical therapy recommends pt DC  home with home PT  Pt requested home PT with [facility]  Pt stated he used them in past and was pleased with their services  Referral placed via [employer]  CM notified OO that pt will not return for STR  Pt's friend will transport him home  Pt stated he has a walker and a cane and no further DC needs were identified

## 2019-08-20 NOTE — PHYSICAL THERAPY NOTE
PHYSICAL THERAPY EVALUATION  NAME:  Shefali Russell  DATE: 08/20/19    AGE:   77 y o  Mrn:   [id number]  ADMIT DX:  Abdominal pain [C99 1]  Renal colic on left side [K46]  RICHIE (acute kidney injury) ([facility] [address] ) [N17 9]    Past Medical History:   Diagnosis Date    Anxiety     Depression     Hyperlipidemia     Hypertension      Length Of Stay: 2  Performed at least 2 patient identifiers during session: Name and Birthday  PHYSICAL THERAPY EVALUATION :    08/20/19 1224   Note Type   Note type Eval/Treat   Pain Assessment   Pain Assessment 0-10   Pain Score 2   Pain Type Acute pain   Effect of Pain on Daily Activities limits speed and indep of mobility   Patient's Stated Pain Goal No pain   Hospital Pain Intervention(s) MD notified (Comment); Ambulation/increased activity; Emotional support   Home Living   Type of [address] Two level Bilevel    (4 LEANDRO w/single rail)   Home Equipment   (walker @ home)  Pt recently @ [facility] for inpt re[facility]  Per case management pt has 2 LEANDRO from front and 3 LEANDRO from back  Prior Function   Level of [place] Independent with ADLs and functional mobility; Needs assistance with IADLs   Lives With Significant other  (girlfriend Tony Kilpatrick)   [address] Help From Other (Comment)  Mely Humphrey)   ADL Assistance Independent   IADLs Independent   Falls in the last 6 months 0   Vocational Retired   Restrictions/Precautions   Wells Lizzy Bearing Precautions Per Order No   Other Precautions Fall Risk;Pain   General   Family/Caregiver Present Yes  Mely Humphrey Friend)   Cognition   Overall Cognitive Status WFL   Arousal/Participation Alert   Attention Attends with cues to redirect   Orientation Level Oriented X4   Memory Within functional limits   Following Commands Follows one step commands with increased time or repetition  (required repetition of command for finger to nose test)   Comments limited tracking w/ vision   RUE Strength   RUE Overall Strength Within Functional Limits - strength 5/5   LUE Strength LUE Overall Strength Within Functional Limits - strength 5/5   RLE Assessment   RLE Assessment   (R knee crepitus)   Strength RLE   R Hip Flexion 4/5   R Knee Extension 4/5   R Ankle Dorsiflexion 4/5   Strength LLE   L Hip Flexion 4/5   L Knee Extension 4/5   L Ankle Dorsiflexion 4/5   Coordination   Movements are Fluid and Coordinated 0   Coordination and Movement Description segmental   Sensation   (hearing WFL)   Light Touch   RLE Light Touch Grossly intact   LLE Light Touch Grossly intact   Transfers   Sit to Stand 6  Modified independent   Additional items Assist x 1   Stand to Sit 6  Modified independent   Additional items Assist x 1   Ambulation/Elevation   Gait pattern Antalgic   Gait Assistance 5  Supervision   Additional items Assist x 1   Assistive Device Rolling walker   Distance 100'   Balance   Static Sitting Good   Static Standing Fair +   Ambulatory Fair -   Endurance Deficit   Endurance Deficit No   Activity Tolerance   Activity Tolerance Patient limited by pain   Medical Staff Made Aware spoke to Dylan Diaz case management   Nurse Made Aware spoke to [facility]    Assessment   Prognosis Good   Problem List Decreased strength;Decreased range of motion; Impaired balance;Decreased mobility;Pain;Obesity   Barriers to Discharge   (stairs)   Goals   Patient Goals to get home and not go back to rehab   STG Expiration Date 08/22/19   Treatment Day 1   Plan   Treatment/Interventions Functional transfer training;LE strengthening/ROM; Elevations; Therapeutic exercise; Endurance training;Cognitive reorientation;Equipment eval/education; Bed mobility;Gait training;Patient/family training;Spoke to nursing   PT Frequency 2-3x/wk   Recommendation   Recommendation Home with family support;Home PT   Equipment Recommended Walker  (and possibly cane for stairs)   Barthel Index   Feeding 10   Bathing 0   Grooming Score 5   Dressing Score 10   Bladder Score 10   Bowels Score 10   Toilet Use Score 5   Transfers (Bed/Chair) Score 10 Mobility (Level Surface) Score 0   Stairs Score 0   Barthel Index Score 60     (Please find full objective findings from PT assessment regarding body systems outlined above)  Assessment: Pt is a 77 y o  male seen for PT evaluation s/p admit to [address] [place] [address] on 8/17/2019 w/ Ureterolithiasis  Order placed for PT  Upon evaluation: Pt requires S assistance for transfers and S assistance for ambulation with rolling walker  Pt's clinical presentation is currently unstable/unpredictable given the functional mobility deficits above, especially gait deviations and pain, coupled with fall risks including impaired balance and impaired coordination, and combined with medical complications of hypertension  and multiple readmissions  Pt to benefit from continued skilled PT tx while in hospital and upon DC to address deficits as defined above and maximize level of functional mobility  From PT/mobility standpoint, recommendation at time of d/c would be Home PT, home with family support and with rolling walker pending progress in order to maximize pt's functional independence and consistency w/ mobility in order to facilitate return to PLOF  Recommend case management consult and stair training  Goals: Pt will: Perform bed mobility tasks to modified I to prepare for transfers and reposition self in bed  Perform transfers to consistant modified I to improve ease of transfers  Perform ambulation with rolling walker to  modified I  to increase Indep in home environment  Perform at least 4 stairs w/one railing and DME PRN To return to home with LEANDRO and return to multilevel home    Comorbidities affecting pt's physical performance at time of assessment include: HTN, GERD, RICHIE, depression, a-fib, R knee OA and scope   Personal factors affecting pt at time of IE include: steps to enter environment, multi-level environment, past experience, inability to perform IADLs and inability to navigate community distances  PHYSICAL THERAPY TREATMENT NOTE  Time In:1224  Time Out:1234  Total Time: 10 min  S:  Pt agreeable to trialing stairs   O:  Transfers modified I, amb w/rolling walker for 20' from [facility] post stair trial  Performed stairs w/ 2 hands on one rail w steadying A x4 stairs, then 1 rail and 1 cane w/steadying A x4 stairs   Cane fitted for pt's height for use on stairs, and s/o instructed of using cane for same as alternative to 2 hands on one rail  A:  Pt was successful in performing stairs during session   P:  Recommend continued PT to progress mobility, but pt is acceptable for DC w/ DME and continued PT services  Pt and s/o verbalize understanding       Paris Peralta, PT, DPT

## 2019-08-20 NOTE — DISCHARGE INSTRUCTIONS
Kidney Stones   WHAT YOU NEED TO KNOW:   Kidney stones form in the urinary system when the water and waste in your urine are out of balance  When this happens, certain types of waste crystals separate from the urine  The crystals build up and form kidney stones  You may have 1 or more kidney stones  DISCHARGE INSTRUCTIONS:   Seek care immediately:   · You have vomiting that is not relieved by medicine  Contact your healthcare provider if:   · You have a fever  · You have trouble passing urine  · You see blood in your urine  · You have severe pain  · You have any questions or concerns about your condition or care  Medicines:   · NSAIDs , such as ibuprofen, help decrease swelling, pain, and fever  This medicine is available with or without a doctor's order  NSAIDs can cause stomach bleeding or kidney problems in certain people  If you take blood thinner medicine, always ask your healthcare provider if NSAIDs are safe for you  Always read the medicine label and follow directions  · Prescription medicine  may be given  Ask how to take this medicine safely  · Medicines  to balance your electrolytes may be needed  · Take your medicine as directed  Contact your healthcare provider if you think your medicine is not helping or if you have side effects  Tell him or her if you are allergic to any medicine  Keep a list of the medicines, vitamins, and herbs you take  Include the amounts, and when and why you take them  Bring the list or the pill bottles to follow-up visits  Carry your medicine list with you in case of an emergency  Follow up with your healthcare provider as directed: You may need to return for more tests  Write down your questions so you remember to ask them during your visits  Self-care:   · Drink plenty of liquids  Your healthcare provider may tell you to drink at least 8 to 12 (eight-ounce) cups of liquids each day  This helps flush out the kidney stones when you urinate  Water is the best liquid to drink  · Strain your urine every time you go to the bathroom  Urinate through a strainer or a piece of thin cloth to catch the stones  Take the stones to your healthcare provider so they can be sent to the lab for tests  This will help your healthcare providers plan the best treatment for you  · Eat a variety of healthy foods  Healthy foods include fruits, vegetables, whole-grain breads, low-fat dairy products, beans, and fish  You may need to limit how much sodium (salt) or protein you eat  Ask for information about the best foods for you  · Stay active  Your stones may pass more easily by if you stay active  Ask about the best activities for you  After you pass your kidney stones:  Once you have passed your kidney stones, your healthcare provider may  order a 24-hour urine test  Results from a 24-hour urine test will help your healthcare provider plan ways to prevent more stones from forming  If you are told to do a 24-hour test, your healthcare provider will give you more instructions  © 2017 [facility] Information is for End User's use only and may not be sold, redistributed or otherwise used for commercial purposes  All illustrations and images included in CareNotes® are the copyrighted property of A D A M , Inc  or Dalton Maldonado  The above information is an  only  It is not intended as medical advice for individual conditions or treatments  Talk to your doctor, nurse or pharmacist before following any medical regimen to see if it is safe and effective for you

## 2019-08-20 NOTE — PLAN OF CARE
Problem: Potential for Falls  Goal: Patient will remain free of falls  Description  INTERVENTIONS:  - Assess patient frequently for physical needs  -  Identify cognitive and physical deficits and behaviors that affect risk of falls    -  [place] fall precautions as indicated by assessment   - Educate patient/family on patient safety including physical limitations  - Instruct patient to call for assistance with activity based on assessment  - Modify environment to reduce risk of injury  - Consider OT/PT consult to assist with strengthening/mobility  Outcome: Progressing     Problem: PAIN - ADULT  Goal: Verbalizes/displays adequate comfort level or baseline comfort level  Description  Interventions:  - Encourage patient to monitor pain and request assistance  - Assess pain using appropriate pain scale  - Administer analgesics based on type and severity of pain and evaluate response  - Implement non-pharmacological measures as appropriate and evaluate response  - Consider cultural and social influences on pain and pain management  - Notify physician/advanced practitioner if interventions unsuccessful or patient reports new pain  Outcome: Progressing     Problem: GASTROINTESTINAL - ADULT  Goal: Minimal or absence of nausea and/or vomiting  Description  INTERVENTIONS:  - Administer IV fluids if ordered to ensure adequate hydration  - Maintain NPO status until nausea and vomiting are resolved  - Nasogastric tube if ordered  - Administer ordered antiemetic medications as needed  - Provide nonpharmacologic comfort measures as appropriate  - Advance diet as tolerated, if ordered  - Consider nutrition services referral to assist patient with adequate nutrition and appropriate food choices  Outcome: Progressing     Problem: GENITOURINARY - ADULT  Goal: Maintains or returns to baseline urinary function  Description  INTERVENTIONS:  - Assess urinary function  - Encourage oral fluids to ensure adequate hydration if ordered  - Administer IV fluids as ordered to ensure adequate hydration  - Administer ordered medications as needed  - Offer frequent toileting  - Follow urinary retention protocol if ordered  Outcome: Progressing     Problem: METABOLIC, FLUID AND ELECTROLYTES - ADULT  Goal: Electrolytes maintained within normal limits  Description  INTERVENTIONS:  - Monitor labs and assess patient for signs and symptoms of electrolyte imbalances  - Administer electrolyte replacement as ordered  - Monitor response to electrolyte replacements, including repeat lab results as appropriate  - Instruct patient on fluid and nutrition as appropriate  Outcome: Progressing  Goal: Fluid balance maintained  Description  INTERVENTIONS:  - Monitor labs   - Monitor I/O and WT  - Instruct patient on fluid and nutrition as appropriate  - Assess for signs & symptoms of volume excess or deficit  Outcome: Progressing     Problem: DISCHARGE PLANNING - CARE MANAGEMENT  Goal: Discharge to post-acute care or home with appropriate resources  Description  INTERVENTIONS:  - Conduct assessment to determine patient/family and health care team treatment goals, and need for post-acute services based on payer coverage, community resources, and patient preferences, and barriers to discharge  - Address psychosocial, clinical, and financial barriers to discharge as identified in assessment in conjunction with the patient/family and health care team  - Arrange appropriate level of post-acute services according to patients   needs and preference and payer coverage in collaboration with the physician and health care team  - Communicate with and update the patient/family, physician, and health care team regarding progress on the discharge plan  - Arrange appropriate transportation to post-acute venues  Outcome: Progressing

## 2019-08-23 LAB
CA PHOS MFR STONE: 2 %
COLOR STONE: NORMAL
COM MFR STONE: 20 %
COMMENT-STONE3: NORMAL
COMPOSITION: NORMAL
LABORATORY COMMENT REPORT: NORMAL
NIDUS STONE QL: NORMAL
PHOTO: NORMAL
SIZE STONE: NORMAL MM
STONE ANALYSIS-IMP: NORMAL
URATE MFR STONE: 78 %
WT STONE: 10 MG

## 2020-04-06 ENCOUNTER — TELEPHONE (OUTPATIENT)
Dept: FAMILY MEDICINE CLINIC | Facility: CLINIC | Age: 70
End: 2020-04-06

## 2020-06-16 RX ORDER — BUSPIRONE HYDROCHLORIDE 10 MG/1
10 TABLET ORAL 2 TIMES DAILY
COMMUNITY
Start: 2020-03-27 | End: 2020-08-10 | Stop reason: SDUPTHER

## 2020-06-16 RX ORDER — APIXABAN 5 MG/1
TABLET, FILM COATED ORAL
COMMUNITY
Start: 2020-03-27 | End: 2020-08-10 | Stop reason: SDUPTHER

## 2020-06-17 ENCOUNTER — OFFICE VISIT (OUTPATIENT)
Dept: FAMILY MEDICINE CLINIC | Facility: CLINIC | Age: 70
End: 2020-06-17
Payer: COMMERCIAL

## 2020-06-17 VITALS
SYSTOLIC BLOOD PRESSURE: 116 MMHG | HEART RATE: 56 BPM | HEIGHT: 70 IN | OXYGEN SATURATION: 98 % | BODY MASS INDEX: 30.78 KG/M2 | WEIGHT: 215 LBS | TEMPERATURE: 97.9 F | DIASTOLIC BLOOD PRESSURE: 78 MMHG | RESPIRATION RATE: 16 BRPM

## 2020-06-17 DIAGNOSIS — I48.0 PAROXYSMAL ATRIAL FIBRILLATION (HCC): Chronic | ICD-10-CM

## 2020-06-17 DIAGNOSIS — C61 PROSTATE CANCER (HCC): ICD-10-CM

## 2020-06-17 DIAGNOSIS — E78.5 HYPERLIPIDEMIA, UNSPECIFIED HYPERLIPIDEMIA TYPE: Chronic | ICD-10-CM

## 2020-06-17 DIAGNOSIS — K21.9 GASTROESOPHAGEAL REFLUX DISEASE, ESOPHAGITIS PRESENCE NOT SPECIFIED: Primary | Chronic | ICD-10-CM

## 2020-06-17 DIAGNOSIS — I10 ESSENTIAL HYPERTENSION: Chronic | ICD-10-CM

## 2020-06-17 PROBLEM — L28.1 PICKER'S NODULES: Status: ACTIVE | Noted: 2020-06-17

## 2020-06-17 PROBLEM — N18.30 CKD (CHRONIC KIDNEY DISEASE) STAGE 3, GFR 30-59 ML/MIN (HCC): Status: ACTIVE | Noted: 2020-06-17

## 2020-06-17 PROBLEM — M15.9 GENERALIZED OA: Status: ACTIVE | Noted: 2020-06-17

## 2020-06-17 PROCEDURE — 1160F RVW MEDS BY RX/DR IN RCRD: CPT | Performed by: FAMILY MEDICINE

## 2020-06-17 PROCEDURE — 3078F DIAST BP <80 MM HG: CPT | Performed by: FAMILY MEDICINE

## 2020-06-17 PROCEDURE — 1100F PTFALLS ASSESS-DOCD GE2>/YR: CPT | Performed by: FAMILY MEDICINE

## 2020-06-17 PROCEDURE — 1036F TOBACCO NON-USER: CPT | Performed by: FAMILY MEDICINE

## 2020-06-17 PROCEDURE — 3288F FALL RISK ASSESSMENT DOCD: CPT | Performed by: FAMILY MEDICINE

## 2020-06-17 PROCEDURE — 3008F BODY MASS INDEX DOCD: CPT | Performed by: FAMILY MEDICINE

## 2020-06-17 PROCEDURE — 3074F SYST BP LT 130 MM HG: CPT | Performed by: FAMILY MEDICINE

## 2020-06-17 PROCEDURE — 99214 OFFICE O/P EST MOD 30 MIN: CPT | Performed by: FAMILY MEDICINE

## 2020-06-17 RX ORDER — PRAVASTATIN SODIUM 40 MG
40 TABLET ORAL
Qty: 90 TABLET | Refills: 3
Start: 2020-06-17 | End: 2020-08-10 | Stop reason: SDUPTHER

## 2020-06-17 RX ORDER — PANTOPRAZOLE SODIUM 40 MG/1
40 TABLET, DELAYED RELEASE ORAL
COMMUNITY
Start: 2019-12-22 | End: 2020-06-17 | Stop reason: ALTCHOICE

## 2020-07-16 ENCOUNTER — TELEPHONE (OUTPATIENT)
Dept: FAMILY MEDICINE CLINIC | Facility: CLINIC | Age: 70
End: 2020-07-16

## 2020-07-16 NOTE — TELEPHONE ENCOUNTER
Kevin Delgado called for patient as patient is having knee surgery 7/27, Facility wanted him to ask about holding medicaitons    They know to hold Vit D and Elloquis but are asking if they should hold  Pravastatin  Metoprolol  Famotidine   Fluoxetine  Amlodipine

## 2020-07-20 ENCOUNTER — TELEPHONE (OUTPATIENT)
Dept: FAMILY MEDICINE CLINIC | Facility: CLINIC | Age: 70
End: 2020-07-20

## 2020-07-20 NOTE — TELEPHONE ENCOUNTER
Pt can take all these meds listed right up to the time of surgery, just hold the day OF surgery   Eliquis should stop 48 hrs before the surgery

## 2020-07-20 NOTE — TELEPHONE ENCOUNTER
Caller called tp state patient is going for knee surgery on Monday 7/27 and the facility needs to know if patient is cleared for surgery can fax to [phone]

## 2020-07-20 NOTE — LETTER
Re: Mr Jt Montalvo,  52      To Whom It May Concern,     Please accept this note as confirmation that the above pt has been seen, examined, and followed up for MANY yrs, and is in a stable state of health  I have cleared him as moderate risk for general anesthesia, given his co-morbidities  Thank you, and any questions, please call me       Very truly yours,       Pino Truong, , FAAFP

## 2020-07-21 NOTE — TELEPHONE ENCOUNTER
Called and spoke to Lashanda Colon, there is no form from the surgeon to be filled out  A letter stating he is cleared for surgery will work  Please type this up and let me know when it's done so I can fax it to the surgeon    Kasie Maurer

## 2020-07-22 NOTE — TELEPHONE ENCOUNTER
I typed a note - should be printed out front? ? Be sure to find it Wed    Surg is [employer]

## 2020-07-25 DIAGNOSIS — I10 ESSENTIAL HYPERTENSION: Primary | Chronic | ICD-10-CM

## 2020-07-25 RX ORDER — AMLODIPINE BESYLATE 5 MG/1
TABLET ORAL
Qty: 90 TABLET | Refills: 3 | Status: SHIPPED | OUTPATIENT
Start: 2020-07-25 | End: 2020-08-10 | Stop reason: ALTCHOICE

## 2020-08-01 ENCOUNTER — TELEPHONE (OUTPATIENT)
Dept: OTHER | Facility: OTHER | Age: 70
End: 2020-08-01

## 2020-08-01 NOTE — TELEPHONE ENCOUNTER
Delroy Stephenson Jordanskylar Cota [phone] Pt: Isabella Walton : 52 Msg: Chest pain 8 out of 10 came on suddenly  History of Afib and recently had a knee replacement     On Call Provider Paged

## 2020-08-03 NOTE — PROGRESS NOTES
Had TKR I believe [employer]   If home, want him to not take any KCL  (K+ 5 8)   Barrera K+ in a wk

## 2020-08-04 ENCOUNTER — NURSING HOME VISIT (OUTPATIENT)
Dept: GERIATRICS | Facility: OTHER | Age: 70
End: 2020-08-04
Payer: COMMERCIAL

## 2020-08-04 DIAGNOSIS — E78.5 HYPERLIPIDEMIA, UNSPECIFIED HYPERLIPIDEMIA TYPE: Chronic | ICD-10-CM

## 2020-08-04 DIAGNOSIS — Z96.652 STATUS POST TOTAL LEFT KNEE REPLACEMENT: Primary | ICD-10-CM

## 2020-08-04 DIAGNOSIS — N18.30 CKD (CHRONIC KIDNEY DISEASE) STAGE 3, GFR 30-59 ML/MIN (HCC): ICD-10-CM

## 2020-08-04 DIAGNOSIS — R26.2 AMBULATORY DYSFUNCTION: ICD-10-CM

## 2020-08-04 DIAGNOSIS — I48.0 PAROXYSMAL ATRIAL FIBRILLATION (HCC): Chronic | ICD-10-CM

## 2020-08-04 DIAGNOSIS — F32.A DEPRESSION, UNSPECIFIED DEPRESSION TYPE: Chronic | ICD-10-CM

## 2020-08-04 DIAGNOSIS — I10 ESSENTIAL HYPERTENSION: Chronic | ICD-10-CM

## 2020-08-04 DIAGNOSIS — K21.9 GASTROESOPHAGEAL REFLUX DISEASE, ESOPHAGITIS PRESENCE NOT SPECIFIED: Chronic | ICD-10-CM

## 2020-08-04 PROCEDURE — 99306 1ST NF CARE HIGH MDM 50: CPT | Performed by: FAMILY MEDICINE

## 2020-08-04 NOTE — PROGRESS NOTES
Madhu [address]  [address] [facility] 27 [facility], [address] [facility] 31  History and Physical    NAME: Yasmine Bello  AGE: 72 y o  SEX: male [id number]    DATE OF ENCOUNTER: 8/9/2020    Code status:  CPR    Assessment and Plan     1  Status post total left knee replacement, OA     - stable     - cont pain meds as ordered     - cont Eliquis for DVT prophylaxis      - WBAT     - PT ordered    2  Ambulatory dysfunction     - Fall precautions in place     - PT/OT ordered    3  Paroxysmal atrial fibrillation (HCC)     - rate controlled     - cont Eliquis 5 mg po bid    4  Gastroesophageal reflux disease, esophagitis presence not specified     - cont Famotidine 20 mg po bid    5  Essential hypertension     - controlled     - cont Amlodipine 5 mg po qd     - cont Metoprolol succinate 25 mg po qd    6  Depression, unspecified depression type     - stable     - cont Fluoxetine 40 mg po qd     - cont Buspirone 10 mg po bid    7  CKD (chronic kidney disease) stage 3, GFR 30-59 ml/min ([facility])     - monitor BUN/Cr    8  Hyperlipidemia, unspecified hyperlipidemia type     - cont Pravastatin 40 mg po qd      All medications and routine orders were reviewed and updated as needed  Plan discussed with: patient    Chief Complaint     Seen for admission at [address] [place] [address]    History of Present Illness     Yasmine Bello, a 71 y/o male underwent elective left knee total arthroplasty for left knee OA, tolerated well, continued on apixaban (was on it for A Fib) for DVT prophylaxis  He was discharged to [facility] for subacute rehab  When he was at [address] [place] [address] Drive he developed chest pain, sent back to the hospital, Cardiac work up was negative, diagnosed as uncontrolled A  Fib, discharged back to [facility]  He was seen and examined at bedside, stable  He has no complaints at this time  He denies any chest pain or SOB  He is participating in PT well, able to walk with assistance   His pain in surgery knee is well controlled on current pain regimen  He wants to go home soon  Staff has no concerns at this time  HISTORY:  Past Medical History:   Diagnosis Date    Anxiety     Arthritis     Depression     Hyperlipidemia     Hypertension     Lumbago     once bad    Obesity      Family History   Problem Relation Age of Onset    No Known Problems Mother     No Known Problems Father     Coronary artery disease Neg Hx     Diabetes Neg Hx     Hypertension Neg Hx      Social History     Socioeconomic History    Marital status:      Spouse name: None    Number of children: 0    Years of education: None    Highest education level: High school graduate   Occupational History    Occupation: unemployed    Social Needs    Financial resource strain: Not hard at all   [employer] insecurity     Worry: Never true     Inability: Never true   [employer] [place] needs     Medical: No     Non-medical: No   Tobacco Use    Smoking status: Never Smoker    Smokeless tobacco: Never Used   Substance and Sexual Activity    Alcohol use: No    Drug use: No    Sexual activity: Yes     Partners: Female   Lifestyle    Physical activity     Days per week: 3 days     Minutes per session: 20 min    Stress: Not at all   Relationships    Social connections     Talks on phone: More than three times a week     Gets together: Twice a week     Attends Yarsanism service: More than 4 times per year     Active member of club or organization: Yes     Attends meetings of clubs or organizations: More than 4 times per year     Relationship status:      Intimate partner violence     Fear of current or ex partner: No     Emotionally abused: No     Physically abused: No     Forced sexual activity: No   Other Topics Concern    None   Social History Narrative    History of Pneumovax-23: 2018    · Most recent tobacco use screenin2019      · Do you currently or have you served in the [employer]:   No       · Education:   12 · Exercise level:   None      · Diet:   Regular      · General stress level:   Medium      · Caffeine intake:   Occasional      · Guns present in home:   No      · Seat belts used routinely:   Yes      · Sunscreen used routinely:   Yes      · Smoke alarm in home: Yes      · Advance directive:   No      · Salt Intake:   occasional     Lost wife in 1996  Has no children  Lives with his significant other  Allergies: Allergies   Allergen Reactions    Meloxicam Other (See Comments)     "blood in urine"    Percocet [Oxycodone-Acetaminophen] Hyperactivity     hyperactivity       Review of Systems     Review of Systems   Constitutional: Positive for activity change and fatigue  HENT: Negative for hearing loss and trouble swallowing  Eyes: Negative  Respiratory: Negative  Cardiovascular: Negative  Gastrointestinal: Negative  Genitourinary: Negative  Musculoskeletal: Positive for arthralgias, gait problem and joint swelling  Neurological: Positive for weakness  Psychiatric/Behavioral: Negative  All other systems reviewed and are negative  as in HPI  Medications and orders     All medications reviewed and updated in Nursing Home EMR  Objective     Vitals: T: 97 9, P: 64, R: 18, BP: 124/70, 95% on RA, Wt: 216 lbs    Physical Exam   Constitutional: He is oriented to person, place, and time  He appears well-developed  No distress  HENT:   Head: Normocephalic and atraumatic  Nose: Nose normal    Mouth/Throat: Mucous membranes are moist  No oropharyngeal exudate  Oropharynx is clear  Eyes: Conjunctivae are normal  Right eye exhibits no discharge  Left eye exhibits no discharge  No scleral icterus  Neck: Normal range of motion  Neck supple  Cardiovascular: Normal rate, regular rhythm and normal heart sounds  Exam reveals no gallop and no friction rub  No murmur heard  Pulmonary/Chest: Effort normal and breath sounds normal  No respiratory distress  He has no wheezes  He exhibits no tenderness  Abdominal: Soft  Normal appearance and bowel sounds are normal  He exhibits no distension  There is no abdominal tenderness  There is no guarding  Musculoskeletal:         General: Swelling and tenderness present  No deformity  Right lower leg: No edema  Left lower leg: No edema  Comments:  Impaired range of motion in left knee   swelling in the left knee   surgical dressing intact   Neurological: He is alert and oriented to person, place, and time  He displays no weakness  No cranial nerve deficit  He exhibits normal muscle tone  Coordination normal    Skin: Skin is warm and dry  He is not diaphoretic  Psychiatric: His behavior is normal  Mood and thought content normal    Nursing note and vitals reviewed  Pertinent Laboratory/Diagnostic Studies: The following labs/studies were reviewed please see chart or hospital paperwork for details     refer to facility chart    - Counseling Documentation: patient was counseled regarding: instructions for management

## 2020-08-06 ENCOUNTER — TRANSITIONAL CARE MANAGEMENT (OUTPATIENT)
Dept: FAMILY MEDICINE CLINIC | Facility: CLINIC | Age: 70
End: 2020-08-06

## 2020-08-09 PROBLEM — Z96.652 STATUS POST TOTAL LEFT KNEE REPLACEMENT: Status: ACTIVE | Noted: 2020-08-09

## 2020-08-09 PROBLEM — R26.2 AMBULATORY DYSFUNCTION: Status: ACTIVE | Noted: 2020-08-09

## 2020-08-10 ENCOUNTER — NURSING HOME VISIT (OUTPATIENT)
Dept: GERIATRICS | Facility: OTHER | Age: 70
End: 2020-08-10
Payer: COMMERCIAL

## 2020-08-10 VITALS
TEMPERATURE: 97.7 F | RESPIRATION RATE: 18 BRPM | OXYGEN SATURATION: 93 % | HEART RATE: 63 BPM | BODY MASS INDEX: 31.44 KG/M2 | SYSTOLIC BLOOD PRESSURE: 137 MMHG | DIASTOLIC BLOOD PRESSURE: 74 MMHG | WEIGHT: 216 LBS

## 2020-08-10 DIAGNOSIS — Z96.652 STATUS POST TOTAL LEFT KNEE REPLACEMENT: Primary | ICD-10-CM

## 2020-08-10 DIAGNOSIS — E78.5 HYPERLIPIDEMIA, UNSPECIFIED HYPERLIPIDEMIA TYPE: Chronic | ICD-10-CM

## 2020-08-10 DIAGNOSIS — I48.0 PAROXYSMAL ATRIAL FIBRILLATION (HCC): Chronic | ICD-10-CM

## 2020-08-10 DIAGNOSIS — I10 ESSENTIAL HYPERTENSION: Chronic | ICD-10-CM

## 2020-08-10 DIAGNOSIS — F32.A DEPRESSION, UNSPECIFIED DEPRESSION TYPE: Chronic | ICD-10-CM

## 2020-08-10 PROCEDURE — 99316 NF DSCHRG MGMT 30 MIN+: CPT | Performed by: PHYSICIAN ASSISTANT

## 2020-08-10 RX ORDER — APIXABAN 5 MG/1
5 TABLET, FILM COATED ORAL 2 TIMES DAILY
Qty: 60 TABLET | Refills: 0 | Status: SHIPPED | OUTPATIENT
Start: 2020-08-10 | End: 2021-01-27 | Stop reason: SDUPTHER

## 2020-08-10 RX ORDER — PRAVASTATIN SODIUM 40 MG
40 TABLET ORAL
Qty: 30 TABLET | Refills: 0 | Status: SHIPPED | OUTPATIENT
Start: 2020-08-10 | End: 2020-09-17 | Stop reason: CLARIF

## 2020-08-10 RX ORDER — METOPROLOL SUCCINATE 25 MG/1
25 TABLET, EXTENDED RELEASE ORAL DAILY
Qty: 30 TABLET | Refills: 0 | Status: SHIPPED | OUTPATIENT
Start: 2020-08-10 | End: 2020-10-18 | Stop reason: SDUPTHER

## 2020-08-10 RX ORDER — FLUOXETINE HYDROCHLORIDE 40 MG/1
40 CAPSULE ORAL DAILY
Qty: 30 CAPSULE | Refills: 0 | Status: SHIPPED | OUTPATIENT
Start: 2020-08-10 | End: 2020-10-05 | Stop reason: SDUPTHER

## 2020-08-10 RX ORDER — BUSPIRONE HYDROCHLORIDE 10 MG/1
10 TABLET ORAL 2 TIMES DAILY
Qty: 60 TABLET | Refills: 0 | Status: SHIPPED | OUTPATIENT
Start: 2020-08-10 | End: 2020-11-08

## 2020-08-10 NOTE — PROGRESS NOTES
[facility]  Ma[address] Nguyen [address]  [phone] [address]: [place]    NAME: Arian Mccall  AGE: 72 y o  SEX: male  DATE OF ADMISSION: 8/3/2020  DATE OF DISCHARGE:8/12/2020  DISCHARGE DISPOSITION: Home with friend  Reason for admission: Patient was admitted from [address] [place] [address] for rehabilitation after hospitalization for s/p L knee replacement  Course of stay: Patient was admitted to [facility] for rehabilitation due to s/p L knee replacement  Significant events during the stay include episode of chest pain on 8/1/2020, for which pt was sent to [facility] ED and returned to facility within 48 hours, workup at ED negative  The patient participated in PT/OT  HPI   [facility] LONNIE is a 73 yo CM with multiple medical comorbidities including but not limited to s/p L knee replacement, HTN, Afib, and MDD, interviewed and examined in collaboration with nursing for SNF discharge  Pt notes L knee post op pain is well controlled with prn ibuprofen  He states pain is usually a 2/10  He notes sometimes pain is worse during therapies  He notes eating well and staying well hydrated  He completes 100% of each meal per facility records  He denies GI and urinary issues  He is continent of urine per facility records  His last BM was today per facility records  Nursing without any concerns at this time  ROS:  Review of Systems   Constitutional: Positive for activity change  Negative for appetite change, chills and fever  HENT: Negative for sore throat  Respiratory: Negative for cough and shortness of breath  Cardiovascular: Positive for leg swelling (slight, L knee)  Negative for chest pain and palpitations  Gastrointestinal: Negative for abdominal distention, abdominal pain, diarrhea, nausea and vomiting  Genitourinary: Negative for difficulty urinating and dysuria  Musculoskeletal: Positive for arthralgias and gait problem     Neurological: Positive for weakness  Negative for dizziness, light-headedness and headaches  Psychiatric/Behavioral: Negative for suicidal ideas  PHYSICAL EXAM:  Vitals:    08/10/20 2047   BP: 137/74   Pulse: 63   Resp: 18   Temp: 97 7 °F (36 5 °C)   SpO2: 93%       Physical Exam  Vitals signs and nursing note reviewed  Constitutional:       General: He is not in acute distress  Appearance: He is obese  He is not ill-appearing, toxic-appearing or diaphoretic  Comments: Overweight male lying comfortably in bed    HENT:      Head: Normocephalic and atraumatic  Nose: Nose normal  No congestion or rhinorrhea  Mouth/Throat:      Mouth: Mucous membranes are moist       Pharynx: Oropharynx is clear  No oropharyngeal exudate or posterior oropharyngeal erythema  Eyes:      General: No scleral icterus  Right eye: No discharge  Left eye: No discharge  Conjunctiva/sclera: Conjunctivae normal    Cardiovascular:      Rate and Rhythm: Normal rate and regular rhythm  Heart sounds: No murmur  No friction rub  No gallop  Pulmonary:      Effort: Pulmonary effort is normal  No respiratory distress  Breath sounds: Normal breath sounds  No stridor  No wheezing, rhonchi or rales  Abdominal:      General: Bowel sounds are normal  There is no distension  Tenderness: There is no abdominal tenderness  There is no guarding or rebound  Musculoskeletal:         General: Swelling (L knee) and tenderness (slight, to palpation) present  Comments: Surgical dressing in place, no discharge noted on dressing, no erythema   LROM L knee   Neurological:      Mental Status: He is alert and oriented to person, place, and time     Psychiatric:      Comments: Pleasant and cooperative during exam          Admission Diagnoses: s/p L knee replacement, Afib, HTN, Depression    Discharge Diagnoses:   Problem List Items Addressed This Visit        Cardiovascular and Mediastinum    Essential hypertension (Chronic) - BP log reviewed, stable   - Continue diltiazem 120 mg PO QD  - continue metoprolol succinate 25 mg PO QHS  - Continue to monitor BP with PCP          Relevant Medications    metoprolol succinate (TOPROL-XL) 25 mg 24 hr tablet    diltiazem (CARDIZEM LA) 120 MG 24 hr tablet    Paroxysmal atrial fibrillation (HCC) (Chronic)     - Continue Eliquis 5 mg PO BID  - Continue metoprolol succinate 25 mg PO QHS   - Continue diltiazem 120mg PO QD  - Follow-up with Cardiology, outpatient appointment scheduled for 8/27/2020          Relevant Medications    Eliquis 5 MG    metoprolol succinate (TOPROL-XL) 25 mg 24 hr tablet    diltiazem (CARDIZEM LA) 120 MG 24 hr tablet       Other    Hyperlipidemia (Chronic)     - Continue pravastatin 40 mg PO QHS         Relevant Medications    pravastatin (PRAVACHOL) 40 mg tablet    Depression (Chronic)     - Denies SI/HI   - continue fluoxetine 40 mg PO QD  - continue buspar 10 mg PO BID   - Continue to follow outpatient with psychiatry         Relevant Medications    busPIRone (BUSPAR) 10 mg tablet    FLUoxetine (PROzac) 40 MG capsule    Status post total left knee replacement - Primary     - Continue voltaren gel prn   - Continue ibuprofen 400mg PO Q12H prn pain   - Follow-up outpatient with orthopaedics, appointment on 8/19/2020   - Continue post-op care per orthopaedics   - Continue PT/OT at home to improve functional status   - Discussed d/c plan with pt and pt's sister (via telephone), note understanding, agreement, and compliance with plan               Follow-up Recommendations: Orthopaedics (8/19/2020), Cardiology (8/27/2020)    Labs and testing performed during stay:  BCAT 42/50   Remainder reviewed in facility chart       Discharge Medications: See discharge medication list which was reviewed and signed      Discussion with patient/family and further instructions:  -Fall precautions  -Aspiration precautions  -Bleeding precautions  -Monitor for signs/symptoms of infection  -Medication list was reviewed and signed  -DME form was completed    Status at time of discharge: Stable    Billing based on time  Time spent on unit, 40 minutes  Time spent counseling pt on debility/condition, 30 minutes        Kat Gupta PA-C  8/10/57058:06 PM

## 2020-08-11 NOTE — ASSESSMENT & PLAN NOTE
- BP log reviewed, stable   - Continue diltiazem 120 mg PO QD  - continue metoprolol succinate 25 mg PO QHS  - Continue to monitor BP with PCP

## 2020-08-11 NOTE — ASSESSMENT & PLAN NOTE
- Denies SI/HI   - continue fluoxetine 40 mg PO QD  - continue buspar 10 mg PO BID   - Continue to follow outpatient with psychiatry

## 2020-08-11 NOTE — ASSESSMENT & PLAN NOTE
- Continue Eliquis 5 mg PO BID  - Continue metoprolol succinate 25 mg PO QHS   - Continue diltiazem 120mg PO QD  - Follow-up with Cardiology, outpatient appointment scheduled for 8/27/2020

## 2020-08-11 NOTE — ASSESSMENT & PLAN NOTE
- Continue voltaren gel prn   - Continue ibuprofen 400mg PO Q12H prn pain   - Follow-up outpatient with orthopaedics, appointment on 8/19/2020   - Continue post-op care per orthopaedics   - Continue PT/OT at home to improve functional status   - Discussed d/c plan with pt and pt's sister (via telephone), note understanding, agreement, and compliance with plan

## 2020-08-24 ENCOUNTER — TELEPHONE (OUTPATIENT)
Dept: FAMILY MEDICINE CLINIC | Facility: CLINIC | Age: 70
End: 2020-08-24

## 2020-08-24 DIAGNOSIS — M17.12 PRIMARY LOCALIZED OSTEOARTHROSIS OF LEFT LOWER LEG: Primary | ICD-10-CM

## 2020-08-24 NOTE — TELEPHONE ENCOUNTER
[facility] nursing called, need one final order for home health for discharge  theyre unable to get in contact with ortho  Per dr connors - ok to T up

## 2020-09-17 ENCOUNTER — OFFICE VISIT (OUTPATIENT)
Dept: FAMILY MEDICINE CLINIC | Facility: CLINIC | Age: 70
End: 2020-09-17
Payer: COMMERCIAL

## 2020-09-17 VITALS
OXYGEN SATURATION: 97 % | HEIGHT: 70 IN | SYSTOLIC BLOOD PRESSURE: 126 MMHG | RESPIRATION RATE: 18 BRPM | WEIGHT: 219.8 LBS | DIASTOLIC BLOOD PRESSURE: 66 MMHG | HEART RATE: 61 BPM | BODY MASS INDEX: 31.47 KG/M2 | TEMPERATURE: 97.3 F

## 2020-09-17 DIAGNOSIS — Z11.59 NEED FOR HEPATITIS C SCREENING TEST: ICD-10-CM

## 2020-09-17 DIAGNOSIS — Z23 FLU VACCINE NEED: Primary | ICD-10-CM

## 2020-09-17 PROCEDURE — 1160F RVW MEDS BY RX/DR IN RCRD: CPT | Performed by: FAMILY MEDICINE

## 2020-09-17 PROCEDURE — 3078F DIAST BP <80 MM HG: CPT | Performed by: FAMILY MEDICINE

## 2020-09-17 PROCEDURE — 90662 IIV NO PRSV INCREASED AG IM: CPT

## 2020-09-17 PROCEDURE — G0439 PPPS, SUBSEQ VISIT: HCPCS | Performed by: FAMILY MEDICINE

## 2020-09-17 PROCEDURE — G0008 ADMIN INFLUENZA VIRUS VAC: HCPCS

## 2020-09-17 PROCEDURE — 3074F SYST BP LT 130 MM HG: CPT | Performed by: FAMILY MEDICINE

## 2020-09-17 PROCEDURE — 1125F AMNT PAIN NOTED PAIN PRSNT: CPT | Performed by: FAMILY MEDICINE

## 2020-09-17 PROCEDURE — 1036F TOBACCO NON-USER: CPT | Performed by: FAMILY MEDICINE

## 2020-09-17 PROCEDURE — 1170F FXNL STATUS ASSESSED: CPT | Performed by: FAMILY MEDICINE

## 2020-09-17 PROCEDURE — 3725F SCREEN DEPRESSION PERFORMED: CPT | Performed by: FAMILY MEDICINE

## 2020-09-17 PROCEDURE — 99214 OFFICE O/P EST MOD 30 MIN: CPT | Performed by: FAMILY MEDICINE

## 2020-09-17 RX ORDER — ROSUVASTATIN CALCIUM 20 MG/1
TABLET, COATED ORAL
COMMUNITY
Start: 2020-08-26 | End: 2021-01-22 | Stop reason: ALTCHOICE

## 2020-09-17 NOTE — PATIENT INSTRUCTIONS

## 2020-09-17 NOTE — PROGRESS NOTES
Assessment/Plan:    72 y o  Male, seen with Sakshi prado, is 7 wks post- op LEFT total knee surgery - Dr Jeaneth Ordonez - and doing well  All meds reviewed  Issues discussed in detail with pt:     · Medicare Wellness - 2d one? · HL - just changed to [facility] from Pravachol  · Ch anti-coag tx -- on Eliquis 5 mg BID (replaced Pradaxa)  · F/u and see Dr Jasmyne Chen q 6 mo or so -- had recent hosp with CP one wk post op  · Depression and dysmorphic personality - on Prozac and helps greatly  Not as hypochondrical  Problem List Items Addressed This Visit        Other    Flu vaccine need - Primary    Relevant Orders    influenza vaccine, high-dose, PF 0 7 mL (FLUZONE HIGH-DOSE) (Completed)      Other Visit Diagnoses     Need for hepatitis C screening test        Relevant Orders    Hepatitis C antibody            Subjective:      Patient ID: Leslye Noel is a 72 y o  male  HPI    The following portions of the patient's history were reviewed and updated as appropriate:   He has a past medical history of Anxiety, Arthritis, Depression, Hyperlipidemia, Hypertension, Lumbago, and Obesity  ,  does not have any pertinent problems on file  ,   has a past surgical history that includes Shoulder arthroscopy (Right, 01/01/2008); Knee arthroscopy (Left, 1990's); and Colonoscopy  ,  family history includes No Known Problems in his father and mother  ,   reports that he has never smoked  He has never used smokeless tobacco  He reports that he does not drink alcohol or use drugs  ,  is allergic to meloxicam and percocet [oxycodone-acetaminophen]     Current Outpatient Medications   Medication Sig Dispense Refill    acetaminophen (TYLENOL) 500 mg tablet Take 1,000 mg by mouth every 6 (six) hours as needed      busPIRone (BUSPAR) 10 mg tablet Take 1 tablet (10 mg total) by mouth 2 (two) times a day 60 tablet 0    Cholecalciferol 2000 units CAPS Take 5,000 Units by mouth daily      diclofenac sodium (VOLTAREN) 1 % APPLY 1 APPLICATION TOPICALLY 4 (FOUR) TIMES A DAY  APPLY TO AFFECTED AREA   diltiazem (CARDIZEM LA) 120 MG 24 hr tablet Take 1 tablet (120 mg total) by mouth daily 30 tablet 0    Eliquis 5 MG Take 1 tablet (5 mg total) by mouth 2 (two) times a day 60 tablet 0    famotidine (PEPCID) 20 mg tablet Take 20 mg by mouth 2 (two) times a day      FLUoxetine (PROzac) 40 MG capsule Take 1 capsule (40 mg total) by mouth daily 30 capsule 0    metoprolol succinate (TOPROL-XL) 25 mg 24 hr tablet Take 1 tablet (25 mg total) by mouth daily 30 tablet 0    mupirocin (BACTROBAN) 2 % ointment APPLY TOPICALLY 2 (TWO) TIMES A DAY  BEGIN 5 DAYS PRIOR TO SURGERY, APPLY TO NOSTRILS      rosuvastatin (CRESTOR) 20 MG tablet TAKE 1 TABLET BY MOUTH EVERY DAY AT NIGHT       No current facility-administered medications for this visit  Review of Systems   Constitutional: Negative for activity change, appetite change, chills, diaphoresis, fatigue, fever and unexpected weight change  HENT: Negative for congestion, dental problem, drooling, ear discharge, ear pain, facial swelling, mouth sores, nosebleeds, postnasal drip, rhinorrhea, trouble swallowing and voice change  Eyes: Negative for photophobia, pain, discharge, redness, itching and visual disturbance  Respiratory: Negative for apnea, cough, choking, chest tightness and shortness of breath  Cardiovascular: Negative for chest pain and leg swelling  Denies any and all cardiac symptms   Gastrointestinal: Negative for abdominal distention, abdominal pain, constipation, diarrhea and nausea  Endocrine: Negative for polydipsia, polyphagia and polyuria  Genitourinary: Negative for decreased urine volume, difficulty urinating, dysuria, enuresis and hematuria  Had renal stone 2019 - resolved now, no issues    Had Ca of prostate in Dec 2018   Musculoskeletal: Negative for arthralgias, back pain, gait problem and joint swelling     Skin: Negative for color change, pallor, rash and wound  Allergic/Immunologic: Negative for immunocompromised state  Neurological: Negative for dizziness, seizures, syncope, facial asymmetry, speech difficulty, light-headedness and headaches  Hematological: Negative for adenopathy  Psychiatric/Behavioral: Negative for agitation, behavioral problems, confusion and decreased concentration  Objective:  Vitals:    09/17/20 1057   BP: 126/66   Pulse: 61   Resp: 18   Temp: (!) 97 3 °F (36 3 °C)   SpO2: 97%   Weight: 99 7 kg (219 lb 12 8 oz)   Height: 5' 9 5" (1 765 m)     Body mass index is 31 99 kg/m²  Physical Exam  Vitals signs and nursing note reviewed  Constitutional:       Appearance: He is well-developed  He is not diaphoretic  HENT:      Head: Normocephalic and atraumatic  Nose: Nose normal    Eyes:      Pupils: Pupils are equal, round, and reactive to light  Neck:      Musculoskeletal: Normal range of motion and neck supple  Trachea: No tracheal deviation  Cardiovascular:      Rate and Rhythm: Normal rate and regular rhythm  Heart sounds: Normal heart sounds  Pulmonary:      Effort: Pulmonary effort is normal       Breath sounds: Normal breath sounds  No wheezing  Abdominal:      Palpations: Abdomen is soft  Musculoskeletal: Normal range of motion  Lymphadenopathy:      Cervical: No cervical adenopathy  Skin:     General: Skin is warm and dry  Neurological:      General: No focal deficit present  Mental Status: He is alert and oriented to person, place, and time  Psychiatric:         Mood and Affect: Mood normal          Behavior: Behavior normal          Thought Content: Thought content normal          Judgment: Judgment normal         Assessment and Plan:     Problem List Items Addressed This Visit     None        BMI Counseling: Body mass index is 31 99 kg/m²   The BMI is above normal  Nutrition recommendations include decreasing portion sizes, encouraging healthy choices of fruits and vegetables, decreasing fast food intake, consuming healthier snacks, limiting drinks that contain sugar, moderation in carbohydrate intake, increasing intake of lean protein, reducing intake of saturated and trans fat and reducing intake of cholesterol  Exercise recommendations include moderate physical activity 150 minutes/week and exercising 3-5 times per week  No pharmacotherapy was ordered  Preventive health issues were discussed with patient, and age appropriate screening tests were ordered as noted in patient's After Visit Summary  Personalized health advice and appropriate referrals for health education or preventive services given if needed, as noted in patient's After Visit Summary       History of Present Illness:     Patient presents for Medicare Annual Wellness visit    Patient Care Team:  Chanelle Ugalde DO as PCP - General (Family Medicine)     Problem List:     Patient Active Problem List   Diagnosis    Ureterolithiasis    Hyperlipidemia    Essential hypertension    GERD (gastroesophageal reflux disease)    [facility]    Depression    Paroxysmal atrial fibrillation (HCC)    Prostate cancer ([facility]    's nodules    Generalized OA    CKD (chronic kidney disease) stage 3, GFR 30-59 ml/min ([facility] )    Status post total left knee replacement    Ambulatory dysfunction      Past Medical and Surgical History:     Past Medical History:   Diagnosis Date    Anxiety     Arthritis     Depression     Hyperlipidemia     Hypertension     Lumbago     once bad    Obesity      Past Surgical History:   Procedure Laterality Date    COLONOSCOPY      KNEE ARTHROSCOPY Left 1990's    Surgery was done because of ligament injury at work in [address] [place] [address] with no postoperative complications    SHOULDER ARTHROSCOPY Right 01/01/2008     for torn rotator cuff in about 2008 with no postoperative complications      Family History:     Family History   Problem Relation Age of Onset    No Known Problems Mother     No Known Problems Father     Coronary artery disease Neg Hx     Diabetes Neg Hx     Hypertension Neg Hx       Social History:     E-Cigarette/Vaping    E-Cigarette Use Never User      E-Cigarette/Vaping Substances    Nicotine No     THC No     CBD No     Flavoring No     Other No     Unknown No      Social History     Socioeconomic History    Marital status:      Spouse name: Not on file    Number of children: 0    Years of education: Not on file    Highest education level: High school graduate   Occupational History    Occupation: unemployed    Social Needs    Financial resource strain: Not hard at all   [employer] insecurity     Worry: Never true     Inability: Never true   [employer] needs     Medical: No     Non-medical: No   Tobacco Use    Smoking status: Never Smoker    Smokeless tobacco: Never Used   Substance and Sexual Activity    Alcohol use: No    Drug use: No    Sexual activity: Yes     Partners: Female   Lifestyle    Physical activity     Days per week: 3 days     Minutes per session: 20 min    Stress: Not at all   Relationships    Social connections     Talks on phone: More than three times a week     Gets together: Twice a week     Attends Rastafarian service: More than 4 times per year     Active member of club or organization: Yes     Attends meetings of clubs or organizations: More than 4 times per year     Relationship status:      Intimate partner violence     Fear of current or ex partner: No     Emotionally abused: No     Physically abused: No     Forced sexual activity: No   Other Topics Concern    Not on file   Social History Narrative    History of Pneumovax-23: 2018    · Most recent tobacco use screenin2019      · Do you currently or have you served in the [employer]:   No       · Education:   12      · Exercise level:   None      · Diet:   Regular      · General stress level:   Medium      · Caffeine intake: Occasional      · Guns present in home:   No      · Seat belts used routinely:   Yes      · Sunscreen used routinely:   Yes      · Smoke alarm in home: Yes      · Advance directive:   No      · Salt Intake:   occasional     Lost wife in 1996  Has no children  Lives with his significant other  Medications and Allergies:     Current Outpatient Medications   Medication Sig Dispense Refill    acetaminophen (TYLENOL) 500 mg tablet Take 1,000 mg by mouth every 6 (six) hours as needed      busPIRone (BUSPAR) 10 mg tablet Take 1 tablet (10 mg total) by mouth 2 (two) times a day 60 tablet 0    Cholecalciferol 2000 units CAPS Take 5,000 Units by mouth daily      diltiazem (CARDIZEM LA) 120 MG 24 hr tablet Take 1 tablet (120 mg total) by mouth daily 30 tablet 0    Eliquis 5 MG Take 1 tablet (5 mg total) by mouth 2 (two) times a day 60 tablet 0    famotidine (PEPCID) 20 mg tablet Take 20 mg by mouth 2 (two) times a day      FLUoxetine (PROzac) 40 MG capsule Take 1 capsule (40 mg total) by mouth daily 30 capsule 0    metoprolol succinate (TOPROL-XL) 25 mg 24 hr tablet Take 1 tablet (25 mg total) by mouth daily 30 tablet 0    pravastatin (PRAVACHOL) 40 mg tablet Take 1 tablet (40 mg total) by mouth daily at bedtime 30 tablet 0     No current facility-administered medications for this visit  Allergies   Allergen Reactions    Meloxicam Other (See Comments)     "blood in urine"    Percocet [Oxycodone-Acetaminophen] Hyperactivity     hyperactivity      Immunizations: There is no immunization history on file for this patient  Health Maintenance:         Topic Date Due    Hepatitis C Screening  1950         Topic Date Due    Influenza Vaccine  07/01/2020      Medicare Health Risk Assessment:     There were no vitals taken for this visit  Augustus Nava is here for his Subsequent Wellness visit  Health Risk Assessment:   Patient rates overall health as very good   Patient feels that their physical health rating is much better  Eyesight was rated as same  Hearing was rated as same  Patient feels that their emotional and mental health rating is same  Pain experienced in the last 7 days has been none  Patient states that he has experienced no weight loss or gain in last 6 months  Just had L TKR 7 wks ago -- July 27th - Dr Shan Rosenbaum at [facility]    Depression Screening:   PHQ-2 Score: 0  PHQ-9 Score: 0      Fall Risk Screening: In the past year, patient has experienced: history of falling in past year    Injured during fall?: No    Feels unsteady when standing or walking?: No    Worried about falling?: No      Home Safety:  Patient does not have trouble with stairs inside or outside of their home  Patient has working smoke alarms and has working carbon monoxide detector  Home safety hazards include: none  Nutrition:   Current diet is Regular  Medications:   Patient is currently taking over-the-counter supplements  OTC medications include: see medication list  Patient is able to manage medications  Activities of Daily Living (ADLs)/Instrumental Activities of Daily Living (IADLs):   Walk and transfer into and out of bed and chair?: Yes  Dress and groom yourself?: Yes    Bathe or shower yourself?: Yes    Feed yourself? Yes  Do your laundry/housekeeping?: No  Manage your money, pay your bills and track your expenses?: Yes  Make your own meals?: Yes    Do your own shopping?: Yes    Advance Care Planning:   Living will: Yes    Advanced directive: Yes    Advanced directive counseling given: Yes    Five wishes given: Yes    End of Life Decisions reviewed with patient: Yes    Provider agrees with end of life decisions: Yes      Comments: Discussed with Akosua Cottrell and pt    No 5 wishes - will think it over    Cognitive Screening:   Provider or family/friend/caregiver concerned regarding cognition?: No    PREVENTIVE SCREENINGS      Cardiovascular Screening:    General: Screening Not Indicated, History Lipid Disorder and Risks and Benefits Discussed      Diabetes Screening:     General: Risks and Benefits Discussed and Screening Current      Colorectal Cancer Screening:     General: Risks and Benefits Discussed      Prostate Cancer Screening:    General: History Prostate Cancer and Screening Not Indicated    Due for: PSA      Osteoporosis Screening:    General: Risks and Benefits Discussed and Patient Declines      Abdominal Aortic Aneurysm (AAA) Screening:    Risk factors include: age between 73-67 yo        General: Risks and Benefits Discussed and Screening Not Indicated      Lung Cancer Screening:     General: Screening Not Indicated      Hepatitis C Screening:    General: Risks and Benefits Discussed    Hep C Screening Accepted: Maryan Knight DO

## 2020-10-02 DIAGNOSIS — F32.A DEPRESSION, UNSPECIFIED DEPRESSION TYPE: Chronic | ICD-10-CM

## 2020-10-05 RX ORDER — FLUOXETINE HYDROCHLORIDE 40 MG/1
40 CAPSULE ORAL DAILY
Qty: 90 CAPSULE | Refills: 3 | Status: SHIPPED | OUTPATIENT
Start: 2020-10-05 | End: 2021-06-08

## 2020-10-05 RX ORDER — FLUOXETINE HYDROCHLORIDE 40 MG/1
CAPSULE ORAL
Qty: 90 CAPSULE | Refills: 1 | OUTPATIENT
Start: 2020-10-05

## 2020-10-15 DIAGNOSIS — I10 ESSENTIAL HYPERTENSION: Chronic | ICD-10-CM

## 2020-10-15 DIAGNOSIS — I48.0 PAROXYSMAL ATRIAL FIBRILLATION (HCC): Chronic | ICD-10-CM

## 2020-10-15 RX ORDER — METOPROLOL SUCCINATE 25 MG/1
TABLET, EXTENDED RELEASE ORAL
Qty: 90 TABLET | Refills: 1 | OUTPATIENT
Start: 2020-10-15

## 2020-10-18 RX ORDER — METOPROLOL SUCCINATE 25 MG/1
25 TABLET, EXTENDED RELEASE ORAL DAILY
Qty: 90 TABLET | Refills: 3 | Status: SHIPPED | OUTPATIENT
Start: 2020-10-18

## 2020-11-03 ENCOUNTER — OFFICE VISIT (OUTPATIENT)
Dept: FAMILY MEDICINE CLINIC | Facility: CLINIC | Age: 70
End: 2020-11-03
Payer: COMMERCIAL

## 2020-11-03 VITALS
SYSTOLIC BLOOD PRESSURE: 140 MMHG | DIASTOLIC BLOOD PRESSURE: 70 MMHG | HEART RATE: 60 BPM | TEMPERATURE: 97 F | WEIGHT: 206 LBS | OXYGEN SATURATION: 98 % | RESPIRATION RATE: 16 BRPM | HEIGHT: 70 IN | BODY MASS INDEX: 29.49 KG/M2

## 2020-11-03 DIAGNOSIS — N28.9 RENAL INSUFFICIENCY: ICD-10-CM

## 2020-11-03 DIAGNOSIS — F32.A DEPRESSION, UNSPECIFIED DEPRESSION TYPE: Chronic | ICD-10-CM

## 2020-11-03 DIAGNOSIS — I48.0 PAROXYSMAL ATRIAL FIBRILLATION (HCC): Chronic | ICD-10-CM

## 2020-11-03 DIAGNOSIS — M25.551 RIGHT HIP PAIN: ICD-10-CM

## 2020-11-03 DIAGNOSIS — Z01.818 PREOPERATIVE CLEARANCE: Primary | ICD-10-CM

## 2020-11-03 DIAGNOSIS — I10 ESSENTIAL HYPERTENSION: Chronic | ICD-10-CM

## 2020-11-03 PROBLEM — M19.90 OSTEOARTHRITIS: Status: ACTIVE | Noted: 2017-07-06

## 2020-11-03 PROBLEM — M16.11 PRIMARY OSTEOARTHRITIS OF RIGHT HIP: Status: ACTIVE | Noted: 2020-10-28

## 2020-11-03 PROCEDURE — 3288F FALL RISK ASSESSMENT DOCD: CPT | Performed by: NURSE PRACTITIONER

## 2020-11-03 PROCEDURE — 3078F DIAST BP <80 MM HG: CPT | Performed by: NURSE PRACTITIONER

## 2020-11-03 PROCEDURE — 3008F BODY MASS INDEX DOCD: CPT | Performed by: NURSE PRACTITIONER

## 2020-11-03 PROCEDURE — 1036F TOBACCO NON-USER: CPT | Performed by: NURSE PRACTITIONER

## 2020-11-03 PROCEDURE — 1101F PT FALLS ASSESS-DOCD LE1/YR: CPT | Performed by: NURSE PRACTITIONER

## 2020-11-03 PROCEDURE — 1160F RVW MEDS BY RX/DR IN RCRD: CPT | Performed by: NURSE PRACTITIONER

## 2020-11-03 PROCEDURE — 3077F SYST BP >= 140 MM HG: CPT | Performed by: NURSE PRACTITIONER

## 2020-11-03 PROCEDURE — 99214 OFFICE O/P EST MOD 30 MIN: CPT | Performed by: NURSE PRACTITIONER

## 2020-11-03 RX ORDER — TRAMADOL HYDROCHLORIDE 50 MG/1
50 TABLET ORAL EVERY 6 HOURS PRN
COMMUNITY
Start: 2020-10-24 | End: 2021-01-22 | Stop reason: ALTCHOICE

## 2020-11-08 DIAGNOSIS — F32.A DEPRESSION, UNSPECIFIED DEPRESSION TYPE: Chronic | ICD-10-CM

## 2020-11-08 RX ORDER — BUSPIRONE HYDROCHLORIDE 10 MG/1
TABLET ORAL
Qty: 180 TABLET | Refills: 3 | Status: SHIPPED | OUTPATIENT
Start: 2020-11-08

## 2020-12-06 LAB — HCV AB SER-ACNC: NEGATIVE

## 2021-01-12 ENCOUNTER — TELEPHONE (OUTPATIENT)
Dept: FAMILY MEDICINE CLINIC | Facility: CLINIC | Age: 71
End: 2021-01-12

## 2021-01-12 NOTE — TELEPHONE ENCOUNTER
Severo Pierre yesterday and is in [facility] - wanted you to know  You were supposed to see him on Thursday, cancelled for now

## 2021-01-13 DIAGNOSIS — K21.9 GASTROESOPHAGEAL REFLUX DISEASE, UNSPECIFIED WHETHER ESOPHAGITIS PRESENT: Primary | Chronic | ICD-10-CM

## 2021-01-13 RX ORDER — FAMOTIDINE 20 MG/1
20 TABLET, FILM COATED ORAL 2 TIMES DAILY
Qty: 60 TABLET | Refills: 3 | Status: SHIPPED | OUTPATIENT
Start: 2021-01-13 | End: 2021-01-14 | Stop reason: SDUPTHER

## 2021-01-13 NOTE — TELEPHONE ENCOUNTER
Patient called requesting refill of Famotidine 20 mg BID  Requested 3 refills  Set up for you just need approval if ok to send

## 2021-01-13 NOTE — TELEPHONE ENCOUNTER
His knee gave out suddenly  There are no other problems, and he is coming home now  PT will be following up with him a few times in the next few weeks

## 2021-01-13 NOTE — TELEPHONE ENCOUNTER
Pls call Poli Adler and find out what happened  Didn't he have 2 d TKR in Dec? Thx   Find out pt's phone # in [address] and I'll call him? ?

## 2021-01-14 ENCOUNTER — TELEPHONE (OUTPATIENT)
Dept: FAMILY MEDICINE CLINIC | Facility: CLINIC | Age: 71
End: 2021-01-14

## 2021-01-14 DIAGNOSIS — K21.9 GASTROESOPHAGEAL REFLUX DISEASE, UNSPECIFIED WHETHER ESOPHAGITIS PRESENT: Chronic | ICD-10-CM

## 2021-01-14 RX ORDER — FAMOTIDINE 20 MG/1
20 TABLET, FILM COATED ORAL 2 TIMES DAILY
Qty: 60 TABLET | Refills: 3 | Status: SHIPPED | OUTPATIENT
Start: 2021-01-14

## 2021-01-14 NOTE — TELEPHONE ENCOUNTER
Diandra Hare called - Famotidine was called into [facility] yesterday but they usually get it from [facility] and a 90 day supply    Could someone please change it for pt?

## 2021-01-15 NOTE — TELEPHONE ENCOUNTER
N - There are an awful lot of "sent to wrong pharmacies" last few days, I think mail aways vs local?  How to solve that?  FYI

## 2021-01-20 ENCOUNTER — NURSING HOME VISIT (OUTPATIENT)
Dept: GERIATRICS | Facility: OTHER | Age: 71
End: 2021-01-20
Payer: COMMERCIAL

## 2021-01-20 DIAGNOSIS — M25.561 RIGHT KNEE PAIN, UNSPECIFIED CHRONICITY: Primary | ICD-10-CM

## 2021-01-20 DIAGNOSIS — I10 ESSENTIAL HYPERTENSION: Chronic | ICD-10-CM

## 2021-01-20 DIAGNOSIS — I48.0 PAROXYSMAL ATRIAL FIBRILLATION (HCC): Chronic | ICD-10-CM

## 2021-01-20 DIAGNOSIS — R26.2 AMBULATORY DYSFUNCTION: ICD-10-CM

## 2021-01-20 DIAGNOSIS — F32.A DEPRESSION, UNSPECIFIED DEPRESSION TYPE: Chronic | ICD-10-CM

## 2021-01-20 LAB — EXT SARS-COV-2: NOT DETECTED

## 2021-01-20 PROCEDURE — 99306 1ST NF CARE HIGH MDM 50: CPT | Performed by: FAMILY MEDICINE

## 2021-01-20 NOTE — PROGRESS NOTES
[address]  [address] [facility] [address] [facility], [address] [facility]  History and Physical    NAME: Stalin Velez  AGE: 72 y o  SEX: male [id number]    DATE OF ENCOUNTER: 1/20/2021    Code status:  CPR    Assessment and Plan     1  Right knee pain, unspecified chronicity     - cont pain meds     - workup was normal      2  Ambulatory dysfunction     - PT/OT ordered     - fall precautions in place     - uses walker at baseline    3  Depression, unspecified depression type     - stable     - cont fluoxetine 40 mg po qd     - cont Buspirone 10 mg po bid    4  Essential hypertension     - controlled     - cont diltiazem  mg po qd     - cont atorvastatin 40 mg po qhs     - cont Metoprolol succinate 25 mg po bid    5  Paroxysmal atrial fibrillation (HCC)     - rate controlled     - cont Eliquis 5 mg po bid      All medications and routine orders were reviewed and updated as needed  Plan discussed with: patient    Chief Complaint     Seen for admission at [address] [place] [address] Av    History of Present Illness     Stalin Velez, a 71 y/o male got admitted to the hospital with right thigh and knee pain and unable to walk  X ray hip was negative for fractures  X ray right leg showed intact right hip and knee prosthesis, no dislocation of patella  CT right leg showed no hematoma, no loosening of the prosthesis  Ortho consulted, recommended pain management He was very emotionally labile, is taking Prozac and Buspar and psych was consulted  He was discharged to [facility] for subacute rehab  He was seen and examined at bedside, stable  He is able to give good history  He lives at home with his significant other, uses walker and cane at home  He denies any falls  He is c/o pain moving from right inner thigh to knee, it comes in waves and improves with leg shaking, pain is not going beyond the knee  He is eating and sleeping well  Staff have no concerns at this time      HISTORY:  Past Medical History:   Diagnosis Date    Anxiety     Arthritis     Depression     Hyperlipidemia     Hypertension     Lumbago     once bad    Obesity      Family History   Problem Relation Age of Onset    No Known Problems Mother     No Known Problems Father     Coronary artery disease Neg Hx     Diabetes Neg Hx     Hypertension Neg Hx      Social History     Socioeconomic History    Marital status:      Spouse name: Not on file    Number of children: 0    Years of education: Not on file    Highest education level: High school graduate   Occupational History    Occupation: unemployed    Social Needs    Financial resource strain: Not hard at all   [employer] insecurity     Worry: Never true     Inability: Never true   [employer] needs     Medical: No     Non-medical: No   Tobacco Use    Smoking status: Never Smoker    Smokeless tobacco: Never Used   Substance and Sexual Activity    Alcohol use: No    Drug use: No    Sexual activity: Yes     Partners: Female   Lifestyle    Physical activity     Days per week: 3 days     Minutes per session: 20 min    Stress: Not at all   Relationships    Social connections     Talks on phone: More than three times a week     Gets together: Twice a week     Attends [employer] service: More than 4 times per year     Active member of club or organization: Yes     Attends meetings of clubs or organizations: More than 4 times per year     Relationship status:      Intimate partner violence     Fear of current or ex partner: No     Emotionally abused: No     Physically abused: No     Forced sexual activity: No   Other Topics Concern    Not on file   Social History Narrative    History of Pneumovax-23: 2018    · Most recent tobacco use screenin2019      · Do you currently or have you served in [employer]:   No       · Education:   12      · Exercise level:   None      · Diet:   Regular      · General stress level:   Medium · Caffeine intake:   Occasional      · Guns present in home:   No      · Seat belts used routinely:   Yes      · Sunscreen used routinely:   Yes      · Smoke alarm in home: Yes      · Advance directive:   No      · Salt Intake:   occasional     Lost wife in 1996  Has no children  Lives with his significant other  Allergies: Allergies   Allergen Reactions    Meloxicam Other (See Comments)     "blood in urine"    Percocet [Oxycodone-Acetaminophen] Hyperactivity     hyperactivity       Review of Systems     Review of Systems   Constitutional: Positive for activity change and fatigue  HENT: Negative for dental problem, hearing loss and trouble swallowing  Eyes: Negative  Respiratory: Negative  Cardiovascular: Negative  Gastrointestinal: Negative  Genitourinary: Negative  Musculoskeletal: Positive for arthralgias and gait problem  Neurological: Positive for weakness  Psychiatric/Behavioral: Negative  All other systems reviewed and are negative  As in HPI  Medications and orders     All medications reviewed and updated in Nursing Home EMR  Objective     Vitals: T: 97 6, P: 66, R: 16, BP: 142/70, 95% on RA, Wt: 222 lbs    Physical Exam  Vitals signs and nursing note reviewed  Constitutional:       General: He is not in acute distress  Appearance: Normal appearance  He is well-developed  He is not diaphoretic  HENT:      Head: Normocephalic and atraumatic  Nose: Nose normal       Mouth/Throat:      Mouth: Mucous membranes are moist       Pharynx: Oropharynx is clear  No oropharyngeal exudate  Eyes:      General: No scleral icterus  Right eye: No discharge  Left eye: No discharge  Extraocular Movements: Extraocular movements intact  Conjunctiva/sclera: Conjunctivae normal    Neck:      Musculoskeletal: Normal range of motion and neck supple  Cardiovascular:      Rate and Rhythm: Normal rate and regular rhythm        Heart sounds: Normal heart sounds  No murmur  No friction rub  No gallop  Pulmonary:      Effort: Pulmonary effort is normal  No respiratory distress  Breath sounds: Normal breath sounds  No wheezing  Chest:      Chest wall: No tenderness  Abdominal:      General: Bowel sounds are normal  There is no distension  Palpations: Abdomen is soft  Tenderness: There is no abdominal tenderness  There is no guarding  Musculoskeletal:         General: No deformity  Right lower leg: No edema  Left lower leg: No edema  Comments: Impaired ROM in right leg, he keeps in flexion at the knee  No visible swelling noted  No tenderness on palpation to the right leg  Skin:     General: Skin is warm and dry  Comments: Previous knee surgery markings on b/l knees  No visible erythema  Neurological:      Mental Status: He is alert and oriented to person, place, and time  Cranial Nerves: No cranial nerve deficit  Motor: No abnormal muscle tone  Coordination: Coordination normal    Psychiatric:      Comments: Cries often, labile mood  Pertinent Laboratory/Diagnostic Studies: The following labs/studies were reviewed please see chart or hospital paperwork for details  Refer to facility chart      - Counseling Documentation: patient was counseled regarding: prognosis

## 2021-01-21 ENCOUNTER — TELEPHONE (OUTPATIENT)
Dept: FAMILY MEDICINE CLINIC | Facility: CLINIC | Age: 71
End: 2021-01-21

## 2021-01-21 NOTE — TELEPHONE ENCOUNTER
Last Friday patient was readmitted to [facility]  Then [facility]   ----he is getting physical therepy

## 2021-01-22 ENCOUNTER — NURSING HOME VISIT (OUTPATIENT)
Dept: GERIATRICS | Facility: OTHER | Age: 71
End: 2021-01-22
Payer: COMMERCIAL

## 2021-01-22 DIAGNOSIS — N18.30 STAGE 3 CHRONIC KIDNEY DISEASE, UNSPECIFIED WHETHER STAGE 3A OR 3B CKD (HCC): ICD-10-CM

## 2021-01-22 DIAGNOSIS — E78.5 HYPERLIPIDEMIA, UNSPECIFIED HYPERLIPIDEMIA TYPE: Chronic | ICD-10-CM

## 2021-01-22 DIAGNOSIS — F32.A DEPRESSION, UNSPECIFIED DEPRESSION TYPE: Chronic | ICD-10-CM

## 2021-01-22 DIAGNOSIS — M25.561 RECURRENT PAIN OF RIGHT KNEE: Primary | ICD-10-CM

## 2021-01-22 DIAGNOSIS — I10 ESSENTIAL HYPERTENSION: Chronic | ICD-10-CM

## 2021-01-22 DIAGNOSIS — I48.0 PAROXYSMAL ATRIAL FIBRILLATION (HCC): Chronic | ICD-10-CM

## 2021-01-22 PROBLEM — M25.569 RECURRENT KNEE PAIN: Status: ACTIVE | Noted: 2021-01-22

## 2021-01-22 PROCEDURE — 99309 SBSQ NF CARE MODERATE MDM 30: CPT | Performed by: PHYSICIAN ASSISTANT

## 2021-01-22 RX ORDER — ATORVASTATIN CALCIUM 40 MG/1
40 TABLET, FILM COATED ORAL DAILY
COMMUNITY
End: 2021-01-27 | Stop reason: SDUPTHER

## 2021-01-22 NOTE — ASSESSMENT & PLAN NOTE
- continue metoprolol for rate control   - continue cardizem   - continue eliquis 5 mg po bid   - f/u with cardiology as an outpatient

## 2021-01-22 NOTE — ASSESSMENT & PLAN NOTE
- s/p b/l knee replacement   - continue acetaminophen prn pain, max dose 3g/day  - continue PT/OT/ST to improve functional status  - continue to assist with ADLs at facility   - continue fall precautions at facility   - continue following with PM&R at facility

## 2021-01-22 NOTE — ASSESSMENT & PLAN NOTE
Lab Results   Component Value Date    EGFR 84 08/20/2019    EGFR 44 08/19/2019    EGFR 44 08/18/2019    CREATININE 0 93 08/20/2019    CREATININE 1 58 (H) 08/19/2019    CREATININE 1 58 (H) 08/18/2019   - BMP and CBC on 1/25/21  - avoid nephrotoxic medications   - renally dose medications  - avoid hypotension

## 2021-01-22 NOTE — ASSESSMENT & PLAN NOTE
- BP log reviewed, stable with -142 since admission   - continue metoprolol succinate 25 mg po qd  - continue cardizem 120mg po qd  - continue to monitor BP at facility   - CBC and BMP on 1/25/21

## 2021-01-22 NOTE — PROGRESS NOTES
[facility]  [address]  [phone]  [place]   CODE 31        NAME: Laila Fortune  AGE: 72 y o  SEX: male    DATE OF ENCOUNTER: 1/22/2021    Assessment and Plan     Essential hypertension  - BP log reviewed, stable with -142 since admission   - continue metoprolol succinate 25 mg po qd  - continue cardizem 120mg po qd  - continue to monitor BP at facility   - CBC and BMP on 1/25/21     Paroxysmal atrial fibrillation (HCC)  - continue metoprolol for rate control   - continue cardizem   - continue eliquis 5 mg po bid   - f/u with cardiology as an outpatient     Hyperlipidemia  - continue atorvastatin 40 mg po qhs    Depression  - continue buspar 10 mg po bid  - continue fluoxetine 40 mg po qd  - denies si/hi     Recurrent knee pain  - s/p b/l knee replacement   - continue acetaminophen prn pain, max dose 3g/day  - continue PT/OT/ST to improve functional status  - continue to assist with ADLs at facility   - continue fall precautions at facility   - continue following with PM&R at facility    CKD (chronic kidney disease) stage 3, GFR 30-59 ml/min ([facility])  Lab Results   Component Value Date    EGFR 84 08/20/2019    EGFR 44 08/19/2019    EGFR 44 08/18/2019    CREATININE 0 93 08/20/2019    CREATININE 1 58 (H) 08/19/2019    CREATININE 1 58 (H) 08/18/2019   - BMP and CBC on 1/25/21  - avoid nephrotoxic medications   - renally dose medications  - avoid hypotension      All medications and routine orders were reviewed and updated as needed  Chief Complaint     R knee/thigh pain    History of Present Illness     [facility] is a 71 yo M with multiple medical comorbidities including but not limited to HTN, Afib, depression, HLD, and ambulatory dysfunction, interviewed and examined in collaboration with nursing for evaluation of acute and chronic medical conditions at SNF  Pt was recently hospitalized at [facility] x 2   He was most recently d/c from [facility] on 1/18/21 following hospitalization for muscle spasms and R knee/thigh pain  Pt has b/l knee replacements  Pt notes R knee/thigh pain is improving overall  He notes acetaminophen prn pain (max dose 3g/day) is helpful in relieving pain  Pt notes pain is worse after participating in PT/OT  He notes pain radiates from knee into groin  He says the pain is "dull" and waxing and waning  Pt otherwise notes doing well  Pt notes eating well and staying well hydrated  Per facility chart, pt completes % of each meal  Pt denies GI and urinary issues  Per facility chart, pt is continent of urine  Per facility chart, pt's last BM was 1/22/21  No other concerns from nursing at this time  Pt is participating in PT/OT/ST at facility  Per PT, pt is independent with bed mobility  Per PT, pt requires set-up assistance with transfers  Per PT, pt able to ambulate 150ft without assistive device and climb stairs x 4 with supervision  Per ST, pt is making improvements with cognition/memory  Per OT, pt requires moderate assistance with toileting hygiene, bathing and lower body dressing  Per OT, pt requires setup assistance with eaing, oral hygiene, and upper body dressing  Per OT, pt requires supervision with toilet transfers  The patient's allergies, past medical, surgical, social and family history were reviewed and unchanged  Review of Systems     Review of Systems   Constitutional: Positive for activity change and fatigue  Negative for appetite change, chills and fever  HENT: Negative for sore throat  Respiratory: Negative for cough and shortness of breath  Cardiovascular: Negative for chest pain and palpitations  Gastrointestinal: Negative for abdominal distention, abdominal pain, diarrhea, nausea and vomiting  Genitourinary: Negative for difficulty urinating and dysuria  Musculoskeletal: Positive for arthralgias and gait problem  Neurological: Positive for weakness   Negative for dizziness, light-headedness and headaches  Psychiatric/Behavioral: Negative for suicidal ideas  Objective     Vitals: 140/76- 97 3F- 72 bpm- 222lb- 18- 96% on RA    Physical Exam  Vitals signs and nursing note reviewed  Constitutional:       General: He is not in acute distress  Appearance: He is not diaphoretic  HENT:      Head: Normocephalic and atraumatic  Nose: Nose normal  No congestion or rhinorrhea  Mouth/Throat:      Mouth: Mucous membranes are moist       Pharynx: No oropharyngeal exudate or posterior oropharyngeal erythema  Eyes:      General: No scleral icterus  Right eye: No discharge  Left eye: No discharge  Conjunctiva/sclera: Conjunctivae normal    Cardiovascular:      Rate and Rhythm: Normal rate and regular rhythm  Heart sounds: No murmur  No friction rub  No gallop  Pulmonary:      Effort: Pulmonary effort is normal  No respiratory distress  Breath sounds: Normal breath sounds  No stridor  No wheezing or rales  Abdominal:      General: Bowel sounds are normal  There is no distension  Palpations: Abdomen is soft  Tenderness: There is no abdominal tenderness  There is no guarding or rebound  Musculoskeletal:         General: No swelling or tenderness  Right lower leg: No edema  Left lower leg: No edema  Comments: Able to lift extremities x 4 against gravity    Skin:     General: Skin is dry  Findings: Erythema (on forehead) and rash (on forehead ) present  Neurological:      Mental Status: He is alert and oriented to person, place, and time     Psychiatric:      Comments: Pleasant and cooperative during most of exam but at times tearful stating that he does not want to "be a burden", able to follow simple one step directions         Pertinent Laboratory/Diagnostic Studies:    Obtained from [facility]-records  CBC WITH DIFF   Collection Time: 01/15/21 3:12 PM   Result Value Ref Range   Hemoglobin 13 7 12 5 - 17 0 g/dL   Hematocrit 41 9 37 0 - 48 0 %   WBC 7 8 4 0 - 10 5 thou/cmm   RBC 4 67 4 00 - 5 40 mill/cmm   Platelet Count 089 022 - 350 thou/cmm   MPV 9 0 7 5 - 11 3 fL   MCV 90 80 - 100 fL   MCH 29 2 27 0 - 36 0 pg   MCHC 32 6 32 0 - 37 0 g/dL   RDW 15 7 12 0 - 16 0 %   Differential Type AUTO   Absolute Neutrophils 5 2 1 8 - 7 8 thou/cmm   Absolute Lymphocytes 1 4 1 0 - 3 0 thou/cmm   Absolute Monocytes 0 9 0 3 - 1 0 thou/cmm   Absolute Eosinophils 0 3 0 0 - 0 5 thou/cmm   Absolute Basophils 0 1 0 0 - 0 1 thou/cmm   Neutrophils 66 %   Lymphocytes 18 %   Monocytes 11 %   Eosinophils 4 %   Basophils 1 %   COMPREHENSIVE METABOLIC PANEL   Collection Time: 01/15/21 3:12 PM   Result Value Ref Range   Glucose 88 65 - 99 mg/dL   BUN 21 7 - 28 mg/dL   Creatinine 0 94 0 53 - 1 30 mg/dL   Sodium 141 135 - 145 mmol/L   Potassium 4 5 3 5 - 5 2 mmol/L   Chloride 108 100 - 109 mmol/L   Carbon Dioxide 27 23 - 31 mmol/L   Calcium 9 2 8 5 - 10 1 mg/dL   Alkaline Phosphatase 133 (H) 35 - 120 U/L   Albumin 3 7 3 5 - 4 8 g/dL   Bilirubin, Total 0 3 0 2 - 1 0 mg/dL   Protein, Total 7 5 6 3 - 8 3 g/dL   AST 17 <41 U/L   ALT 25 <56 U/L   Anion Gap 6 3 - 11   eGFR, Non-African American 82 >60   eGFR,  95 >60   eGFR Comment Please refer to initial GFR, CALC footnote     STUDIES  Xr Chest 1 Vw Portable    Result Date: 1/11/2021  IMPRESSION: No acute cardiopulmonary abnormality  Workstation:[id number]    Xr Shoulder 2+ Vw Right    Result Date: 1/11/2021  IMPRESSION: No acute fracture or dislocation  [id number]    Xr Elbow 2 Vw Right    Result Date: 1/11/2021  Impression: No acute fracture or dislocation  [id number]    Xr Hip 2 To 3 Views With Pelvis Right    Result Date: 12/23/2020  IMPRESSION: 1  Well aligned right hip arthroplasty, without an acute periprosthetic fracture Workstation:[id number]    Xr Femur 2 Vw Right    Result Date: 1/11/2021  IMPRESSION: No acute fracture or dislocation  Stable intact right hip and right knee prosthesis   No dislocation of the patella  [id number]    Xr Patella Right 3 Views    Result Date: 1/11/2021  IMPRESSION: No acute fracture or dislocation  Stable intact right hip and right knee prosthesis  No dislocation of the patella  Workstation:[id number]    Ct Head Wo Contrast    Result Date: 1/11/2021  Impression: Motion limited examination  No definite acute intracranial hemorrhage or depressed acute calvarial fracture  White matter chronic small vessel ischemic disease  Workstation:[id number]    Xr Pelvis 1 Or 2 Vw    Result Date: 1/11/2021  IMPRESSION: 1  Limited study secondary to suboptimal positioning  2  Partially visualized total right hip arthroplasty without evidence of acute dislocation or periprosthetic fracture in the included portion  3  There is slightly increased varus angulation at the femoral head component with respect to the acetabular cup  4  Repeat examination with more optimal positioning and including the entire arthroplasty is suggested for better evaluation  Workstation:[id number]    Ct Femur Right W Contrast    Result Date: 1/15/2021  Impression: Unremarkable enhanced CT of the right femur  No acute or subacute hematoma, fracture or dislocation  Right hip and knee prosthesis  No loosening of prosthesis or hardware failure CT examination performed with dose lowering protocol in accordance with ALARA  Workstation:[id number]    Xr Hip 2 To 3 Views Without Pelvis Right    Result Date: 1/11/2021  IMPRESSION: No acute fracture or dislocation  Stable intact right hip and right knee prosthesis  No dislocation of the patella  Workstation:[id number]          Current Medications   Medications reviewed and updated see facility [facility] for details      Joy Castillo PA-C  1/22/2021 3:44 PM

## 2021-01-27 ENCOUNTER — NURSING HOME VISIT (OUTPATIENT)
Dept: GERIATRICS | Facility: OTHER | Age: 71
End: 2021-01-27
Payer: COMMERCIAL

## 2021-01-27 DIAGNOSIS — E78.5 HYPERLIPIDEMIA, UNSPECIFIED HYPERLIPIDEMIA TYPE: Chronic | ICD-10-CM

## 2021-01-27 DIAGNOSIS — I10 ESSENTIAL HYPERTENSION: Chronic | ICD-10-CM

## 2021-01-27 DIAGNOSIS — M25.561 RECURRENT PAIN OF RIGHT KNEE: Primary | ICD-10-CM

## 2021-01-27 DIAGNOSIS — I48.0 PAROXYSMAL ATRIAL FIBRILLATION (HCC): Chronic | ICD-10-CM

## 2021-01-27 DIAGNOSIS — N18.30 STAGE 3 CHRONIC KIDNEY DISEASE, UNSPECIFIED WHETHER STAGE 3A OR 3B CKD (HCC): ICD-10-CM

## 2021-01-27 DIAGNOSIS — F32.A DEPRESSION, UNSPECIFIED DEPRESSION TYPE: Chronic | ICD-10-CM

## 2021-01-27 PROCEDURE — 99316 NF DSCHRG MGMT 30 MIN+: CPT | Performed by: PHYSICIAN ASSISTANT

## 2021-01-27 RX ORDER — ATORVASTATIN CALCIUM 40 MG/1
40 TABLET, FILM COATED ORAL DAILY
Qty: 30 TABLET | Refills: 0 | Status: SHIPPED | OUTPATIENT
Start: 2021-01-27 | End: 2021-02-11 | Stop reason: SDUPTHER

## 2021-01-27 NOTE — ASSESSMENT & PLAN NOTE
Lab Results   Component Value Date    EGFR 84 08/20/2019    EGFR 44 08/19/2019    EGFR 44 08/18/2019    CREATININE 0 93 08/20/2019    CREATININE 1 58 (H) 08/19/2019    CREATININE 1 58 (H) 08/18/2019   - avoid nephrotoxic medications   - renally dose medications  - avoid hypotension  - creatinine stable at 1 02 and GFR 75 on 1/25/21   - f/u with PCP

## 2021-01-27 NOTE — ASSESSMENT & PLAN NOTE
- BP log reviewed, stable with SBP 120s-140s since admission, one outlier at 162  - continue metoprolol succinate 25 mg po qd  - continue cardizem 120mg po qd  - continue to monitor BP at facility   - CBC and BMP on 1/25/21 reviewed, appears to be at baseline  - f/u with PCP as an outpatient

## 2021-01-27 NOTE — PROGRESS NOTES
[facility]  [address]  [phone] [id number] [address]: [place]     NAME: Aviles   AGE: 72 y o  SEX: male  DATE OF ADMISSION: 1/18/21  DATE OF DISCHARGE:1/28/21  DISCHARGE DISPOSITION: Home  Reason for admission: Patient was admitted from [place] [address] for rehabilitation after hospitalization for R knee/thigh pain  Course of stay:   [facility]INE is a 71 yo M with multiple medical comorbidities including but not limited to HTN, Afib, depression, HLD, and ambulatory dysfunction, interviewed and examined in collaboration with nursing for evaluation of acute and chronic medical conditions for discharge from SNF        Pt was recently hospitalized at [facility] x 2  He was most recently d/c from [place] [address] on 1/18/21 following hospitalization for muscle spasms and R knee/thigh pain  Pt has b/l knee replacements  Pt denies pain at this time  Pt notes eating well and staying well hydrated  Per facility chart, pt completes % of each meal  Pt denies GI and urinary issues  Per facility chart, pt is continent of urine  Per facility chart, pt's last BM was 1/26/21  No other concerns from nursing at this time  Pt is participating in PT/OT/ST at facility  Per PT, pt is independent with bed mobility  Per PT, pt requires set-up assistance with transfers  Per PT, pt able to ambulate 150ft without assistive device and climb stairs x 5 with stand by asssitance for cues  Per ST, pt is making improvements with cognition/memory  Per OT, pt requires setup assistance with eating, oral hygiene, toileting hygiene, and upper body dressing  Per OT, pt requires supervision with toilet transfers, bathing, and lower body dressing  Of note, pt did receive 1st dose of COVID-19 vaccine at facility on 1/26/21, no adverse reactions noted in chart review  Discussed with transition nurse at facility, pt will need to return to facility in about 4 weeks and receive 2nd dose  ROS:  Review of Systems   Constitutional: Positive for activity change  Negative for appetite change, chills and fever  HENT: Negative for sore throat  Respiratory: Negative for cough and shortness of breath  Cardiovascular: Negative for chest pain and palpitations  Gastrointestinal: Negative for abdominal distention, abdominal pain, diarrhea, nausea and vomiting  Genitourinary: Negative for difficulty urinating and dysuria  Musculoskeletal: Positive for arthralgias and gait problem  Neurological: Positive for weakness  Negative for dizziness, light-headedness and headaches  Psychiatric/Behavioral: Negative for suicidal ideas  PHYSICAL EXAM:  Vitals   135/60- 97 4F- 66 bpm- 18- 96% on RA    Physical Exam  Vitals signs and nursing note reviewed  Constitutional:       General: He is not in acute distress  Appearance: He is not diaphoretic  Comments: Older adult male, lying comfortably in bed    HENT:      Head: Normocephalic and atraumatic  Nose: Nose normal  No congestion or rhinorrhea  Mouth/Throat:      Mouth: Mucous membranes are moist       Pharynx: Oropharynx is clear  No oropharyngeal exudate or posterior oropharyngeal erythema  Eyes:      General: No scleral icterus  Right eye: No discharge  Left eye: No discharge  Conjunctiva/sclera: Conjunctivae normal    Cardiovascular:      Rate and Rhythm: Normal rate and regular rhythm  Heart sounds: No murmur  No friction rub  No gallop  Pulmonary:      Effort: Pulmonary effort is normal  No respiratory distress  Breath sounds: Normal breath sounds  No stridor  No wheezing or rales  Abdominal:      General: Bowel sounds are normal  There is no distension  Palpations: Abdomen is soft  Tenderness: There is no abdominal tenderness  There is no guarding or rebound  Musculoskeletal:      Right lower leg: No edema  Left lower leg: No edema     Neurological:      Mental Status: He is alert and oriented to person, place, and time     Psychiatric:      Comments: Pleasant and cooperative during exam          Admission Diagnoses: R knee pain, ambulatory dysfunction, depression, HTN, Afib    Discharge Diagnoses:      Essential hypertension  - BP log reviewed, stable with SBP 120s-140s since admission, one outlier at 162  - continue metoprolol succinate 25 mg po qd  - continue cardizem 120mg po qd  - continue to monitor BP at facility   - CBC and BMP on 1/25/21 reviewed, appears to be at baseline  - f/u with PCP as an outpatient    Paroxysmal atrial fibrillation (HCC)  - continue metoprolol for rate control   - continue cardizem   - continue eliquis 5 mg po bid   - f/u with cardiology as an outpatient     Hyperlipidemia  - continue atorvastatin 40 mg po qhs  - f/u with PCP as an outpatient     Depression  - continue buspar 10 mg po bid  - continue fluoxetine 40 mg po qd  - denies si/hi    CKD (chronic kidney disease) stage 3, GFR 30-59 ml/min ([facility])  Lab Results   Component Value Date    EGFR 84 08/20/2019    EGFR 44 08/19/2019    EGFR 44 08/18/2019    CREATININE 0 93 08/20/2019    CREATININE 1 58 (H) 08/19/2019    CREATININE 1 58 (H) 08/18/2019   - avoid nephrotoxic medications   - renally dose medications  - avoid hypotension  - creatinine stable at 1 02 and GFR 75 on 1/25/21   - f/u with PCP    Recurrent knee pain  - s/p b/l knee replacement   - continue acetaminophen prn pain, max dose 3g/day  - continue PT/OT/ST at home to improve functional status  - continue to assist with ADLs at home  - continue fall precautions at home  - f/u with PCP       Follow-up Recommendations: PCP, orthopaedics as needed, cardiology     Labs and testing performed during stay:  8/01/58  BASIC METABOLIC PNL  GLUCOSE 77 mg/dL 65-99 Final  BUN 21 mg/dL 7-28 Final  CREATININE 1 02 mg/dL 0 53-1 30 Final  SODIUM 141 mmol/L 135-145 Final  POTASSIUM 4 9 mmol/L 3 5-5 2 Final  CHLORIDE 108 mmol/L 100-109 Final  CARBON DIOXIDE 24 mmol/L 23-31 Final  CALCIUM 8 6 mg/dL 8 5-10 1 Final  ANION GAP 9 3-11 Final  eGFR, NON  AM 75 >60 Final  eGFR,  AMER 87 >60 Final  eGFR COMMENT (Note) Final  Units: mL/min per 1 73 square meters  Normal Function or Mild Renal  Disease (if clinically at risk): >or=60  Moderately Decreased: 30-59  Severely Decreased: 15-29  Renal Failure: <15  Please note that the eGFR is based on the CKD-EPI calculation, and is  not intended to be used for drug dosing  Note: Calculated GFR may not be an accurate indicator of renal  function if the patient's renal function is not in a steady state  CBC NO DIFF  HEMOGLOBIN 13 1 g/dL 12 5-17 0 Final  HEMATOCRIT 40 4 % 37 0-48 0 Final  WBC 8 0 thou/cmm 4 0-10 5 Final  RBC 4 55 mill/cmm 4 00-5 40 Final  PLATELET COUNT 467 thou/cmm 140-350 Final    New Medications:   Medication Changes:   Discontinued Medications:  Discharge Medications: See discharge medication list which was reviewed and signed  Discussion with patient/family and further instructions:  -Fall precautions  -Aspiration precautions  -Bleeding precautions  -Monitor for signs/symptoms of infection  -Medication list was reviewed and signed  -DME form was completed    Status at time of discharge: Stable    Billing based on time  Time spent on unit, 40 minutes  Time spent counseling pt on debility/condition, 30 minutes        Manuel Laboy PA-C  3/81/96837:80 PM

## 2021-01-27 NOTE — ASSESSMENT & PLAN NOTE
- s/p b/l knee replacement   - continue acetaminophen prn pain, max dose 3g/day  - continue PT/OT/ST at home to improve functional status  - continue to assist with ADLs at home  - continue fall precautions at home  - f/u with PCP

## 2021-02-04 ENCOUNTER — TELEPHONE (OUTPATIENT)
Dept: FAMILY MEDICINE CLINIC | Facility: CLINIC | Age: 71
End: 2021-02-04

## 2021-02-04 DIAGNOSIS — R26.2 AMBULATORY DYSFUNCTION: Primary | ICD-10-CM

## 2021-02-05 ENCOUNTER — TELEPHONE (OUTPATIENT)
Dept: FAMILY MEDICINE CLINIC | Facility: CLINIC | Age: 71
End: 2021-02-05

## 2021-02-05 NOTE — TELEPHONE ENCOUNTER
Called Isabella back and was unable to get in touch with her  I left a message on her voicemail to please call the office

## 2021-02-05 NOTE — TELEPHONE ENCOUNTER
LM on FD VM @ 10:57 am     Would like to send orders over for continues PT OT and Skilled nursing      Give her a call  [phone]

## 2021-02-08 NOTE — TELEPHONE ENCOUNTER
Not exactly sure what this was about  I was hoping to be able to speak to Eleuterio Acuna to gain more information   I will let you know if I need anything:)

## 2021-02-10 DIAGNOSIS — E78.5 HYPERLIPIDEMIA, UNSPECIFIED HYPERLIPIDEMIA TYPE: Chronic | ICD-10-CM

## 2021-02-11 RX ORDER — ATORVASTATIN CALCIUM 40 MG/1
TABLET, FILM COATED ORAL
Qty: 90 TABLET | Refills: 1 | OUTPATIENT
Start: 2021-02-11

## 2021-02-11 RX ORDER — ATORVASTATIN CALCIUM 40 MG/1
40 TABLET, FILM COATED ORAL DAILY
Qty: 30 TABLET | Refills: 6 | Status: SHIPPED | OUTPATIENT
Start: 2021-02-11

## 2021-03-02 ENCOUNTER — OFFICE VISIT (OUTPATIENT)
Dept: FAMILY MEDICINE CLINIC | Facility: CLINIC | Age: 71
End: 2021-03-02
Payer: COMMERCIAL

## 2021-03-02 VITALS
BODY MASS INDEX: 31.81 KG/M2 | WEIGHT: 222.2 LBS | TEMPERATURE: 97.4 F | RESPIRATION RATE: 12 BRPM | DIASTOLIC BLOOD PRESSURE: 64 MMHG | HEART RATE: 59 BPM | SYSTOLIC BLOOD PRESSURE: 128 MMHG | OXYGEN SATURATION: 96 % | HEIGHT: 70 IN

## 2021-03-02 DIAGNOSIS — Z09 FOLLOW-UP EXAM AFTER TREATMENT: Primary | ICD-10-CM

## 2021-03-02 PROCEDURE — 1036F TOBACCO NON-USER: CPT | Performed by: NURSE PRACTITIONER

## 2021-03-02 PROCEDURE — 1101F PT FALLS ASSESS-DOCD LE1/YR: CPT | Performed by: NURSE PRACTITIONER

## 2021-03-02 PROCEDURE — 3288F FALL RISK ASSESSMENT DOCD: CPT | Performed by: NURSE PRACTITIONER

## 2021-03-02 PROCEDURE — 1160F RVW MEDS BY RX/DR IN RCRD: CPT | Performed by: NURSE PRACTITIONER

## 2021-03-02 PROCEDURE — 99213 OFFICE O/P EST LOW 20 MIN: CPT | Performed by: NURSE PRACTITIONER

## 2021-03-02 PROCEDURE — 3008F BODY MASS INDEX DOCD: CPT | Performed by: NURSE PRACTITIONER

## 2021-03-02 PROCEDURE — 3078F DIAST BP <80 MM HG: CPT | Performed by: NURSE PRACTITIONER

## 2021-03-02 PROCEDURE — 3074F SYST BP LT 130 MM HG: CPT | Performed by: NURSE PRACTITIONER

## 2021-03-02 RX ORDER — AMOXICILLIN 250 MG
1 CAPSULE ORAL DAILY
COMMUNITY

## 2021-03-02 RX ORDER — ROSUVASTATIN CALCIUM 20 MG/1
TABLET, COATED ORAL
COMMUNITY
Start: 2021-02-08 | End: 2021-08-18 | Stop reason: SDUPTHER

## 2021-03-02 NOTE — PROGRESS NOTES
BMI Counseling: Body mass index is 32 34 kg/m²  The BMI is above normal  Nutrition recommendations include decreasing portion sizes, encouraging healthy choices of fruits and vegetables, decreasing fast food intake, consuming healthier snacks, limiting drinks that contain sugar, moderation in carbohydrate intake, increasing intake of lean protein, reducing intake of saturated and trans fat and reducing intake of cholesterol  Exercise recommendations include vigorous physical activity 75 minutes/week, exercising 3-5 times per week, obtaining a gym membership and strength training exercises  No pharmacotherapy was ordered  Depression Screening and Follow-up Plan: Patient assessed for underlying major depression  Brief counseling provided and recommend additional follow-up/re-evaluation next office visit  Falls Plan of Care: balance, strength, and gait training instructions were provided  Medications that increase falls were reviewed  Vitamin D supplementation was recommended  Home safety education provided  Assessment/Plan:         Problem List Items Addressed This Visit     None            Subjective:      Patient ID: Yonatan Brooks is a 72 y o  male  Patient is a 72year old male present for a check-up since being in nursing home from 1/18/21-1/28/21  No current concerns or issues verbalized  The following portions of the patient's history were reviewed and updated as appropriate:   Past Medical History:  He has a past medical history of Anxiety, Arthritis, Depression, Hyperlipidemia, Hypertension, Lumbago, and Obesity  ,  _______________________________________________________________________  Medical Problems:  does not have any pertinent problems on file ,  _______________________________________________________________________  Past Surgical History:   has a past surgical history that includes Shoulder arthroscopy (Right, 01/01/2008);  Knee arthroscopy (Left, 1990's); and Colonoscopy  ,  _______________________________________________________________________  Family History:  family history includes No Known Problems in his father and mother ,  _______________________________________________________________________  Social History:   reports that he has never smoked  He has never used smokeless tobacco  He reports that he does not drink alcohol or use drugs  ,  _______________________________________________________________________  Allergies:  is allergic to meloxicam and percocet [oxycodone-acetaminophen]     _______________________________________________________________________  Current Outpatient Medications   Medication Sig Dispense Refill    acetaminophen (TYLENOL) 500 mg tablet Take 1,000 mg by mouth every 6 (six) hours as needed      apixaban (Eliquis) 5 mg Take 1 tablet (5 mg total) by mouth 2 (two) times a day 60 tablet 0    atorvastatin (LIPITOR) 40 mg tablet Take 1 tablet (40 mg total) by mouth daily 30 tablet 6    busPIRone (BUSPAR) 10 mg tablet TAKE 1 TABLET BY MOUTH TWICE A  tablet 3    Cholecalciferol 2000 units CAPS Take 5,000 Units by mouth daily      diclofenac sodium (VOLTAREN) 1 % APPLY 1 APPLICATION TOPICALLY 4 (FOUR) TIMES A DAY  APPLY TO AFFECTED AREA   diltiazem (CARDIZEM LA) 120 MG 24 hr tablet Take 1 tablet (120 mg total) by mouth daily 30 tablet 0    famotidine (PEPCID) 20 mg tablet Take 1 tablet (20 mg total) by mouth 2 (two) times a day 60 tablet 3    FLUoxetine (PROzac) 40 MG capsule Take 1 capsule (40 mg total) by mouth daily 90 capsule 3    metoprolol succinate (TOPROL-XL) 25 mg 24 hr tablet Take 1 tablet (25 mg total) by mouth daily 90 tablet 3    rosuvastatin (CRESTOR) 20 MG tablet TAKE 1 TABLET BY MOUTH EVERY DAY AT NIGHT      senna-docusate sodium (SENOKOT S) 8 6-50 mg per tablet Take 1 tablet by mouth daily As needed       No current facility-administered medications for this visit  _______________________________________________________________________  Review of Systems   Constitutional: Negative for appetite change, chills, fatigue and fever  HENT: Negative for congestion, ear pain, postnasal drip, rhinorrhea, sinus pain and sore throat  Eyes: Negative for pain, redness and visual disturbance  Respiratory: Negative for cough and shortness of breath  Cardiovascular: Negative for chest pain  Gastrointestinal: Negative for abdominal pain, diarrhea, nausea and vomiting  Endocrine: Negative  Genitourinary: Negative for dysuria and hematuria  Musculoskeletal: Negative for arthralgias  Skin: Negative  Allergic/Immunologic: Negative  Neurological: Negative  Hematological: Negative  Psychiatric/Behavioral: Negative  Objective:  Vitals:    03/02/21 1202   BP: 128/64   Pulse: 59   Resp: 12   Temp: (!) 97 4 °F (36 3 °C)   TempSrc: Tympanic   SpO2: 96%   Weight: 101 kg (222 lb 3 2 oz)   Height: 5' 9 5" (1 765 m)     Body mass index is 32 34 kg/m²  Physical Exam  Vitals signs and nursing note reviewed  Constitutional:       Appearance: Normal appearance  He is well-developed and normal weight  HENT:      Head: Normocephalic and atraumatic  Right Ear: Tympanic membrane, ear canal and external ear normal       Left Ear: Tympanic membrane, ear canal and external ear normal       Nose: Nose normal       Mouth/Throat:      Mouth: Mucous membranes are moist    Eyes:      Extraocular Movements: Extraocular movements intact  Conjunctiva/sclera: Conjunctivae normal       Pupils: Pupils are equal, round, and reactive to light  Neck:      Musculoskeletal: Normal range of motion  Cardiovascular:      Rate and Rhythm: Normal rate and regular rhythm  Pulses: Normal pulses  Heart sounds: Normal heart sounds  No murmur  Pulmonary:      Effort: Pulmonary effort is normal       Breath sounds: Normal breath sounds     Abdominal:      General: Bowel sounds are normal       Palpations: Abdomen is soft  Musculoskeletal: Normal range of motion  Skin:     General: Skin is warm  Capillary Refill: Capillary refill takes less than 2 seconds  Neurological:      General: No focal deficit present  Mental Status: He is alert and oriented to person, place, and time     Psychiatric:         Mood and Affect: Mood normal          Behavior: Behavior normal

## 2021-03-02 NOTE — PATIENT INSTRUCTIONS
Total Hip Replacement   AMBULATORY CARE:   What you need to know about total hip replacement (THR):  THR is surgery to replace a hip joint damaged by wear, injury, or osteoarthritis  It is also called total hip arthroplasty  The hip joint is where the top of your femur (thigh bone) sits in the socket of your pelvic bone  The joint is held together by ligaments and muscles  The top of your femur is shaped like a ball and covered with cartilage  Cartilage is a tissue that helps joints move  How to prepare for THR:   · Your healthcare provider will check your overall health  He or she will ask about your current hip pain or stiffness  Tell your provider how pain or stiffness affects your daily activities or ability to play sports  He or she may also ask about fatigue, anxiety, or depression you may have  · Some medicines will need to be stopped weeks before surgery  These medicines include blood thinning medicine, such as aspirin and ibuprofen  It also includes some antirheumatic medicines  Make sure your healthcare provider knows all medicines you are taking  Also ask how long before surgery to stop taking them  · The week before surgery:    ? Ask a family member or friend to drive you home when you are discharged from the hospital  Ask someone to stay with you for 1 to 2 weeks after surgery, in case you need help  ? You may need to move furniture around to make room for assistive devices that will be used after surgery  You may need a cane or a walker to get around safely  ? Keep all self-care items and most commonly used items within your reach  After surgery, you will need to avoid bending down or reaching over your head  ? You will not be able to sit or get up from low seating after surgery  Check to see if your chairs and toilet seats are too low  ? Ask your healthcare provider about other things you can do to make your home safer      · Your healthcare provider may tell you not to eat or drink anything after midnight on the day of your surgery  He or she will tell you what medicines to take or not take on the day of your surgery  What will happen during THR:   · You may be given general anesthesia to keep you asleep and free from pain during surgery  You may instead be given regional anesthesia, such as spinal or epidural anesthesia, or a peripheral nerve block  Regional anesthesia keeps you numb from the waist down, but you will be awake during surgery  · Your healthcare provider will talk to you about which of the following procedures is right for you:    ? The conventional hip replacement  involves 1 incision  Your healthcare provider will make the incision along the front, side, or back of your hip  ? The minimally invasive hip replacement surgery  involves a smaller incision that is about 4 to 8 inches in length  Your healthcare provider may make an incision along the front, side, or back of your hip  He or she will use fluoroscopy (a type of x-ray) as a guide during the surgery  · The ball of your femur and the socket of your pelvis will be removed  A hip implant will replace the bones that were removed  Your healthcare provider may use medical cement to secure the implant parts  He or she may use an implant that has a porous surface  This surface allows your own bone to grow and fill the pores of the implant  Your healthcare provider may use both cement to hold the ball in place, and a porous socket implant  A drain may be placed to remove extra blood and fluids from the surgery area  Your incision will be closed with stitches or staples and covered with a bandage  What will happen after THR:   · It is normal to have increased stiffness and pain after surgery  Your pain and stiffness should get better with exercise  · Do not get out of bed until your healthcare provider says it is okay   Healthcare providers will teach you how to sit up and move without causing damage to your hip  A physical therapist will help you walk after your surgery  When you walk the same day after surgery, it helps decrease pain and improves the function of your hip  You may use crutches or a walker  · You may be in the hospital for up to 5 days, or you may go home shortly after surgery  Your healthcare provider may talk to you about rehabilitation you can do at home  A physical therapist can teach you exercises to help strengthen your hip and prevent stiffness  You may also need occupational therapy to teach you the best ways to bathe and dress  Risks of THR:   · You may have more hip pain, or your hip joint may become stiff or numb  Your joint movement may not be as stable as it was before your surgery  You may have bone loss, or the bones near the implant area may break or crack  You may bleed more than expected or get an infection  Your nerves, blood vessels, ligaments, or muscles may be damaged during surgery  · Your implant may become loose or move out of place  If this happens, you may need another surgery to replace the implant  You may need surgery to remove your implant if you have an allergic reaction to the materials  You may get a blood clot in your limb  This may become life-threatening  Call 911 for any of the following:   · You have chest pain when you take a deep breath or cough  You may cough up blood  · You suddenly feel lightheaded and short of breath  · You have a seizure or feel confused  Seek care immediately if:   · Your leg feels warm, tender, and painful  It may look swollen and red  · Your incision comes apart  · You urinate less than usual or not at all  Contact your healthcare provider if:   · You have a fever or chills  · Your wound is red, swollen, or draining pus  · Blood soaks through your bandage  · You have nausea or are vomiting  · You have more pain and swelling in your hip joint, even after you take pain medicine      · You have questions or concerns about your condition or care  Medicines:   · Blood thinners  help prevent blood clots  Clots can cause strokes, heart attacks, and death  The following are general safety guidelines to follow while you are taking a blood thinner:    ? Watch for bleeding and bruising while you take blood thinners  Watch for bleeding from your gums or nose  Watch for blood in your urine and bowel movements  Use a soft washcloth on your skin, and a soft toothbrush to brush your teeth  This can keep your skin and gums from bleeding  If you shave, use an electric shaver  Do not play contact sports  ? Tell your dentist and other healthcare providers that you take a blood thinner  Wear a bracelet or necklace that says you take this medicine  ? Do not start or stop any other medicines unless your healthcare provider tells you to  Many medicines cannot be used with blood thinners  ? Take your blood thinner exactly as prescribed by your healthcare provider  Do not skip does or take less than prescribed  Tell your provider right away if you forget to take your blood thinner, or if you take too much  ? Warfarin  is a blood thinner that you may need to take  The following are things you should be aware of if you take warfarin:     § Foods and medicines can affect the amount of warfarin in your blood  Do not make major changes to your diet while you take warfarin  Warfarin works best when you eat about the same amount of vitamin K every day  Vitamin K is found in green leafy vegetables and certain other foods  Ask for more information about what to eat when you are taking warfarin  § You will need to see your healthcare provider for follow-up visits when you are on warfarin  You will need regular blood tests  These tests are used to decide how much medicine you need  · Prescription pain medicine  may be given  Ask your healthcare provider how to take this medicine safely   Some prescription pain medicines contain acetaminophen  Do not take other medicines that contain acetaminophen without talking to your healthcare provider  Too much acetaminophen may cause liver damage  Prescription pain medicine may cause constipation  Ask your healthcare provider how to prevent or treat constipation  · Take your medicine as directed  Contact your healthcare provider if you think your medicine is not helping or if you have side effects  Tell him of her if you are allergic to any medicine  Keep a list of the medicines, vitamins, and herbs you take  Include the amounts, and when and why you take them  Bring the list or the pill bottles to follow-up visits  Carry your medicine list with you in case of an emergency  Self-care:   · Use your assistive devices as directed  Examples include a walker, cane, and a reacher  These devices will help decrease your risk for falls  · Do your exercises several times each day  The physical therapist will teach you exercises to build strength and prevent blood clots  Prevent dislocation of your hip implant:  Do the following for up to 8 weeks after your hip replacement:  · Sit in a straight-backed chair  Use armrests to help you rise from a seated position  Do not  sit in low chairs, sofas, rocking chairs, or stools  · Use assistive devices to put on socks and shoes  Do not  lean forward to put on pants, socks or shoes  Do not lean forward or twist to pick items up  · Keep your knees apart  Do not  cross your legs  You may need to put a pillow between your knees to remind you  Follow up with your healthcare provider as directed: You will need to have your stitches or staples removed  Your healthcare provider may contact you weeks after your surgery  He or she may ask if surgery helped relieve your pain or stiffness  Tell your provider how well you are able to do your daily activities after surgery   Also tell him or her if you are having any problems with mobility or range of motion  Write down your questions so you remember to ask them during your visits  Care for your incision area as directed:  Do not get the area wet until it is completely healed  Ask your healthcare provider when it is okay to get the area wet  Change your bandage as directed and if it gets wet or dirty  Physical therapy:  A physical therapist teaches you exercises to help improve movement and strength, and to decrease pain  If you are not able to be safe at home, you may be admitted to a rehabilitation facility  © Copyright [address] Information is for End User's use only and may not be sold, redistributed or otherwise used for commercial purposes  All illustrations and images included in CareNotes® are the copyrighted property of [employer]  or [facility]   The above information is an  only  It is not intended as medical advice for individual conditions or treatments  Talk to your doctor, nurse or pharmacist before following any medical regimen to see if it is safe and effective for you  Cholesterol and Your Health   WHAT YOU NEED TO KNOW:   What is cholesterol? Cholesterol is a waxy, fat-like substance  Your body uses cholesterol to make hormones and new cells, and to protect nerves  Cholesterol is made by your body  It also comes from certain foods you eat, such as meat and dairy products  Your healthcare provider can help you set goals for your cholesterol levels  He or she can help you create a plan to meet your goals  What are cholesterol level goals? Your cholesterol level goals depend on your risk for heart disease, your age, and your other health conditions  The following are general guidelines:  · Total cholesterol  includes low-density lipoprotein (LDL), high-density lipoprotein (HDL), and triglyceride levels  The total cholesterol level should be lower than 200 mg/dL and is best at about 150 mg/dL      · LDL cholesterol  is called bad cholesterol  because it forms plaque in your arteries  As plaque builds up, your arteries become narrow, and less blood flows through  When plaque decreases blood flow to your heart, you may have chest pain  If plaque completely blocks an artery that brings blood to your heart, you may have a heart attack  Plaque can break off and form blood clots  Blood clots may block arteries in your brain and cause a stroke  The level should be less than 130 mg/dL and is best at about 100 mg/dL  · HDL cholesterol  is called good cholesterol  because it helps remove LDL cholesterol from your arteries  It does this by attaching to LDL cholesterol and carrying it to your liver  Your liver breaks down LDL cholesterol so your body can get rid of it  High levels of HDL cholesterol can help prevent a heart attack and stroke  Low levels of HDL cholesterol can increase your risk for heart disease, heart attack, and stroke  The level should be 60 mg/dL or higher  · Triglycerides  are a type of fat that store energy from foods you eat  High levels of triglycerides also cause plaque buildup  This can increase your risk for a heart attack or stroke  If your triglyceride level is high, your LDL cholesterol level may also be high  The level should be less than 150 mg/dL  What increases my risk for high cholesterol? · Smoking cigarettes    · Being overweight or obese, or not getting enough exercise    · Drinking large amounts of alcohol    · A medical condition such as hypertension (high blood pressure) or diabetes    · Certain genes passed from your parents to you    · Age older than 72 years    What do I need to know about having my cholesterol levels checked? Adults 21to 39years of age should have their cholesterol levels checked every 4 to 6 years  Adults 45 years or older should have their cholesterol checked every 1 to 2 years   You may need your cholesterol checked more often, or at a younger age, if you have risk factors for heart disease  You may also need to have your cholesterol checked more often if you have other health conditions, such as diabetes  Blood tests are used to check cholesterol levels  Blood tests measure your levels of triglycerides, LDL cholesterol, and HDL cholesterol  How do healthy fats affect my cholesterol levels? Healthy fats, also called unsaturated fats, help lower LDL cholesterol and triglyceride levels  Healthy fats include the following:  · Monounsaturated fats  are found in foods such as olive oil, canola oil, avocado, nuts, and olives  · Polyunsaturated fats,  such as omega 3 fats, are found in fish, such as salmon, trout, and tuna  They can also be found in plant foods such as flaxseed, walnuts, and soybeans  How do unhealthy fats affect my cholesterol levels? Unhealthy fats increase LDL cholesterol and triglyceride levels  They are found in foods high in cholesterol, saturated fat, and trans fat:  · Cholesterol  is found in eggs, dairy, and meat  · Saturated fat  is found in butter, cheese, ice cream, whole milk, and coconut oil  Saturated fat is also found in meat, such as sausage, hot dogs, and bologna  · Trans fat  is found in liquid oils and is used in fried and baked foods  Foods that contain trans fats include chips, crackers, muffins, sweet rolls, microwave popcorn, and cookies  How is high cholesterol treated? Treatment for high cholesterol will also decrease your risk of heart disease, heart attack, and stroke  Treatment may include any of the following:  · Lifestyle changes  may include food, exercise, weight loss, and quitting smoking  You may also need to decrease the amount of alcohol you drink  Your healthcare provider will want you to start with lifestyle changes  Other treatment may be added if lifestyle changes are not enough  · Medicines  may be given to lower your LDL cholesterol, triglyceride levels, or total cholesterol level   You may need medicines to lower your cholesterol if any of the following is true:     ? You have a history of stroke, TIA, unstable angina, or a heart attack  ? Your LDL cholesterol level is 190 mg/dL or higher  ? You are age 36 to 76 years, have diabetes or heart disease risk factors, and your LDL cholesterol is 70 mg/dL or higher  · Supplements  include fish oil, red yeast rice, and garlic  Fish oil may help lower your triglyceride and LDL cholesterol levels  It may also increase your HDL cholesterol level  Red yeast rice may help decrease your total cholesterol level and LDL cholesterol level  Garlic may help lower your total cholesterol level  Do not take these supplements without talking to your healthcare provider  What food changes can I make to lower my cholesterol levels? A dietitian can help you create a healthy eating plan  He or she can show you how to read food labels and choose foods low in saturated fat, trans fats, and cholesterol  · Decrease the total amount of fat you eat  Choose lean meats, fat-free or 1% fat milk, and low-fat dairy products, such as yogurt and cheese  Try to limit or avoid red meats  Limit or do not eat fried foods or baked goods, such as cookies  · Replace unhealthy fats with healthy fats  Cook foods in olive oil or canola oil  Choose soft margarines that are low in saturated fat and trans fat  Seeds, nuts, and avocados are other examples of healthy fats  · Eat foods with omega-3 fats  Examples include salmon, tuna, mackerel, walnuts, and flaxseed  Eat fish 2 times per week  Pregnant women should not eat fish that have high levels of mercury, such as shark, swordfish, and janiya mackerel  · Increase the amount of high-fiber foods you eat  High-fiber foods can help lower your LDL cholesterol  Aim to get between 20 and 30 grams of fiber each day  Fruits and vegetables are high in fiber  Eat at least 5 servings each day   Other high-fiber foods are whole-grain or whole-wheat breads, pastas, or cereals, and brown rice  Eat 3 ounces of whole-grain foods each day  Increase fiber slowly  You may have abdominal discomfort, bloating, and gas if you add fiber to your diet too quickly  · Eat healthy protein foods  Examples include low-fat dairy products, skinless chicken and turkey, fish, and nuts  · Limit foods and drinks that are high in sugar  Your dietitian or healthcare provider can help you create daily limits for high-sugar foods and drinks  The limit may be lower if you have diabetes or another health condition  Limits can also help you lose weight if needed  What lifestyle changes can I make to lower my cholesterol levels? · Maintain a healthy weight  Ask your healthcare provider what a healthy weight is for you  Ask him or her to help you create a weight loss plan if needed  Weight loss can decrease your total cholesterol and triglyceride levels  Weight loss may also help keep your blood pressure at a healthy level  · Exercise regularly  Exercise can help lower your total cholesterol level and maintain a healthy weight  Exercise can also help increase your HDL cholesterol level  Work with your healthcare provider to create an exercise program that is right for you  Get at least 30 to 40 minutes of moderate exercise most days of the week  Examples of exercise include brisk walking, swimming, or biking  · Do not smoke  Nicotine and other chemicals in cigarettes and cigars can raise your cholesterol levels  Ask your healthcare provider for information if you currently smoke and need help to quit  E-cigarettes or smokeless tobacco still contain nicotine  Talk to your healthcare provider before you use these products  · Limit or do not drink alcohol  Alcohol can increase your triglyceride levels  Ask your healthcare provider before you drink alcohol  Ask how much is okay for you to drink in 1 day or 1 week      CARE AGREEMENT:   You have the right to help plan your care  Discuss treatment options with your healthcare provider to decide what care you want to receive  You always have the right to refuse treatment  The above information is an  only  It is not intended as medical advice for individual conditions or treatments  Talk to your doctor, nurse or pharmacist before following any medical regimen to see if it is safe and effective for you  © Copyright [address] Information is for End User's use only and may not be sold, redistributed or otherwise used for commercial purposes  All illustrations and images included in CareNotes® are the copyrighted property of [employer]  or [address] Prevention for Older Adults   WHAT YOU NEED TO KNOW:   As you age, your muscles weaken and your risk for falls increases  Your risk also increases if you take medicines that make you sleepy or dizzy  You may also be at risk if you have vision or joint problems, have low blood pressure, or are not active  DISCHARGE INSTRUCTIONS:   Call 911 or have someone else call if:   · You have fallen and are unconscious  · You have fallen and cannot move part of your body  Contact your healthcare provider if:   · You have fallen and have pain or a headache  · You have questions or concerns about your condition or care  Fall prevention tips:   · Stay active  Exercise can help strengthen your muscles and improve your balance  Your healthcare provider may recommend water aerobics, walking, or [employer]  He or she may also recommend physical therapy to improve your coordination  Never start an exercise program without asking your healthcare provider first             · Wear shoes that fit well and have soles that   Wear shoes both inside and outside  Use slippers with good   Avoid shoes with high heels  · Use assistive devices as directed    Your healthcare provider may suggest that you use a cane or walker to help you keep your balance  You may need to have grab bars put in your bathroom near the toilet or in the shower  · Stand or sit up slowly  This may help you keep your balance and prevent falls  · Wear a personal alarm  This is a device that allows you to call 911 if you need help  Ask for more information on personal alarms  · Manage your medical conditions  Keep all appointments with your healthcare providers  Visit your eye doctor as directed  Home safety tips:       · Add items to prevent falls in the bathroom  Put nonslip strips on your bath or shower floor to prevent you from slipping  Use a bath mat if you do not have carpet in the bathroom  This will prevent you from falling when you step out of the bath or shower  Use a shower seat so you do not need to stand while you shower  Sit on the toilet or a chair in your bathroom to dry yourself and put on clothing  This will prevent you from losing your balance from drying or dressing yourself while you are standing  · Keep paths clear  Remove books, shoes, and other objects from walkways and stairs  Place cords for telephones and lamps out of the way so that you do not need to walk over them  Tape them down if you cannot move them  Remove small rugs  If you cannot remove a rug, secure it with double-sided tape  This will prevent you from tripping  · Install bright lights in your home  Use night lights to help light paths to the bathroom or kitchen  Always turn on the light before you start walking  · Keep items you use often on shelves within reach  Do not use a step stool to help you reach an item  · Paint or place reflective tape on the edges of your stairs  This will help you see the stairs better  Follow up with your healthcare provider as directed:  Write down your questions so you remember to ask them during your visits     © Copyright [address] Information is for End User's use only and may not be sold, redistributed or otherwise used for commercial purposes  All illustrations and images included in CareNotes® are the copyrighted property of [employer]  or [employer]ena Hurt[employer]  The above information is an  only  It is not intended as medical advice for individual conditions or treatments  Talk to your doctor, nurse or pharmacist before following any medical regimen to see if it is safe and effective for you

## 2021-06-08 DIAGNOSIS — F32.A DEPRESSION, UNSPECIFIED DEPRESSION TYPE: Chronic | ICD-10-CM

## 2021-06-08 RX ORDER — FLUOXETINE HYDROCHLORIDE 40 MG/1
CAPSULE ORAL
Qty: 90 CAPSULE | Refills: 3 | Status: SHIPPED | OUTPATIENT
Start: 2021-06-08

## 2021-08-18 DIAGNOSIS — I48.0 PAROXYSMAL ATRIAL FIBRILLATION (HCC): Chronic | ICD-10-CM

## 2021-08-18 DIAGNOSIS — I10 ESSENTIAL HYPERTENSION: Chronic | ICD-10-CM

## 2021-08-18 DIAGNOSIS — E78.5 HYPERLIPIDEMIA, UNSPECIFIED HYPERLIPIDEMIA TYPE: Primary | ICD-10-CM

## 2021-08-18 RX ORDER — ROSUVASTATIN CALCIUM 20 MG/1
20 TABLET, COATED ORAL DAILY
Qty: 90 TABLET | Refills: 3 | Status: SHIPPED | OUTPATIENT
Start: 2021-08-18

## 2021-08-18 RX ORDER — AMLODIPINE BESYLATE 2.5 MG/1
2.5 TABLET ORAL DAILY
Qty: 90 TABLET | Refills: 1 | Status: SHIPPED | OUTPATIENT
Start: 2021-08-18

## 2021-08-30 ENCOUNTER — RA CDI HCC (OUTPATIENT)
Dept: OTHER | Facility: HOSPITAL | Age: 71
End: 2021-08-30

## 2021-08-30 NOTE — PROGRESS NOTES
Based on clinical documentation indicated in your record, it appears that the patient may have the following conditions:    I48 0 Atrial fibrillation     If this is correct, please document and assess at your next visit Sept      [facility] 75  coding opportunities          Number of diagnosis code(s) already on the problem list added to FYI fla                  [facility] 75  coding opportunities          Number of diagnosis code(s) already on the problem list added to FYI fla                     Patients insurance company: [employer] (Medicare Advantage and [employer])     Visit status: Patient canceled the appointment             Patients insurance company: [employer] (Medicare Advantage and [employer])

## 2022-06-30 ENCOUNTER — NURSING HOME VISIT (OUTPATIENT)
Dept: GERIATRICS | Facility: OTHER | Age: 72
End: 2022-06-30
Payer: COMMERCIAL

## 2022-06-30 DIAGNOSIS — G45.9 TIA (TRANSIENT ISCHEMIC ATTACK): Primary | ICD-10-CM

## 2022-06-30 DIAGNOSIS — K21.9 GASTROESOPHAGEAL REFLUX DISEASE, UNSPECIFIED WHETHER ESOPHAGITIS PRESENT: ICD-10-CM

## 2022-06-30 DIAGNOSIS — Z79.01 CURRENT USE OF LONG TERM ANTICOAGULATION: ICD-10-CM

## 2022-06-30 DIAGNOSIS — E78.2 MIXED HYPERLIPIDEMIA: ICD-10-CM

## 2022-06-30 DIAGNOSIS — I48.0 PAROXYSMAL ATRIAL FIBRILLATION (HCC): ICD-10-CM

## 2022-06-30 DIAGNOSIS — I10 ESSENTIAL HYPERTENSION: ICD-10-CM

## 2022-06-30 DIAGNOSIS — F32.89 OTHER DEPRESSION: ICD-10-CM

## 2022-06-30 PROCEDURE — 99306 1ST NF CARE HIGH MDM 50: CPT | Performed by: FAMILY MEDICINE

## 2022-06-30 NOTE — PROGRESS NOTES
[address] [facility]  [address] [facility] 27 [facility], [address] [facility] 31  History and Physical    NAME: Williams Barron  AGE: 71 y o  SEX: male [id number]    DATE OF ENCOUNTER: 6/30/2022    Code status:  FULL    OO-POS 31    Assessment and Plan     1  TIA (transient ischemic attack), mixed hyperlipidemia  - no new deficits reported  ESR and CRP were negative  MRI of the brain was negative for acute infarction  Shows mild volume loss and white matter disease in the supratentorial brain  MRA - head and neck showed carotid bifurcations are clear, right vertebral artery is dominant, vertebral origin cannot be cleared  Intracranial MRI limited by motion but clear  Vascular carotid Dupplex showed right ICA with less than 50% stenosis and left ICA also with less than 50% stenosis  Bilateral vertebral arteries with normally directed antegrade flow  - continue maximum dose statin as per inpatient neurologist recommendations  - continue to encourage dietary & behavioral modification  - PT/OT    2  Essential hypertension  - B/P 150/82, previously was 130/77  - continue Cardizem CD 1 120 mg p o  q day  - continue Toprol XL 25 mg p o  HS and continue to monitor for side effects  - continue to encourage well-balanced low-salt heart healthy nutrition    3  Other depression  - PHQ 2- negative, no SI/HI  - continue buspirone 10 mg p o  twice daily  - continue fluoxetine 40 mg p o  Q a m   - continue emotional support and follow with his behavioral health physician    4  Gastroesophageal reflux disease, unspecified whether esophagitis present  - asymptomatic on current regimen  - continue pantoprazole 40 mg p o  q a m   - dietary and behavioral modifications also discussed    5   Paroxysmal atrial fibrillation, current use of long-term anticoagulation   - asymptomatic and hemodynamically stable  - continue beta-blocker for rate control  - continue anticoagulation for stroke prevention and monitor for bleeding      All medications and routine orders were reviewed and updated as needed  Plan discussed with: patient, nurse    Chief Complaint     Seen for admission at [facility] [address]    History of Present Illness     Mr Beatrice Mitchell is a 71-year-old  male with significant history of intellectual disability, prostate cancer, depression, hyperlipidemia, atrial fibrillation, hypertension, gastroesophageal reflux disease, gout, chronic upper and lower extremity pain amongst other medical conditions seen for admission to [facility] for right-sided weakness  Patient was admitted from 06/24 [place] 06/29/2022 to [facility] for generalized weakness and concern for TIA/stroke, as reports noticing weakness in his R-side, this includes both upper and lower extremities  Patient was seen by [facility] on 06/24/2022  He was noted to have weakness on his right side, able to hold his leg up against gravity however there was significant weakness when compared to the left side  Head CT showed abnormal lucency on the left aspect of the alyssa (5mm) not present on prior CT (01/2021)  Patient reported compliant with his anticoagulant for Afib  Per neurology's patient's symptoms were not typical for stroke  ESR and CRP were negative  MRI of the brain was negative for acute infarction  Shows mild volume loss and white matter disease in the supratentorial brain  MRA - head and neck showed carotid bifurcations are clear, right vertebral artery is dominant, vertebral origin cannot be cleared  Intracranial MRI limited by motion but clear  Vascular carotid Dupplex showed right ICA with less than 50% stenosis and left ICA also with less than 50% stenosis  Bilateral vertebral arteries with normally directed antegrade flow    During his hospitalization his dose of Crestor was discontinued and he was started on Lipitor and maximum dose bedtime he was also continued for another on Eliquis for underlying atrial fibrillation  Patient was seen and evaluated by PT OT, recommending inpatient rehab  Patient seen and examined in his room  Comfortable reading the newspaper, talking in full sentences without dyspnea, states "I'm ready to go home", lives with a friend - Alek Ovalle  Denies angina, palpitation or visual changes  No headache  Eating okay reports no swallowing difficulty  Denies insomnia  Voiding with no difficulties, denies hematuria  Reports positive BM last night, no melena or bright blood per rectum  Denies pain      HISTORY:  Past Medical History:   Diagnosis Date    Anxiety     Arthritis     Depression     Hyperlipidemia     Hypertension     Lumbago     once bad    Obesity      Past Surgical History:   Procedure Laterality Date    COLONOSCOPY      KNEE ARTHROSCOPY Left     Surgery was done because of ligament injury at work in [address] [place] [address] with no postoperative complications    SHOULDER ARTHROSCOPY Right 2008     for torn rotator cuff in about  with no postoperative complications       Family History   Problem Relation Age of Onset    No Known Problems Mother     No Known Problems Father     Coronary artery disease Neg Hx     Diabetes Neg Hx     Hypertension Neg Hx      Social History     Socioeconomic History    Marital status:       Spouse name: None    Number of children: 0    Years of education: None    Highest education level: High school graduate   Occupational History    Occupation: unemployed    Tobacco Use    Smoking status: Never Smoker    Smokeless tobacco: Never Used   Vaping Use    Vaping Use: Never used   Substance and Sexual Activity    Alcohol use: No    Drug use: No    Sexual activity: Yes     Partners: Female   Other Topics Concern    None   Social History Narrative    History of Pneumovax-23: 2018    · Most recent tobacco use screenin2019      · Do you currently or have you served in the [employer]:   No       · Education:   12 · Exercise level:   None      · Diet:   Regular      · General stress level:   Medium      · Caffeine intake:   Occasional      · Guns present in home:   No      · Seat belts used routinely:   Yes      · Sunscreen used routinely:   Yes      · Smoke alarm in home: Yes      · Advance directive:   No      · Salt Intake:   occasional     Lost wife in 1996  Has no children  Lives with his significant other  Social Determinants of Health     Financial Resource Strain: Not on file   Food Insecurity: Not on file   Transportation Needs: Not on file   Physical Activity: Not on file   Stress: Not on file   Social Connections: Not on file   Intimate Partner Violence: Not on file   Housing Stability: Not on file       Allergies: Allergies   Allergen Reactions    Meloxicam Other (See Comments)     "blood in urine"    Percocet [Oxycodone-Acetaminophen] Hyperactivity     hyperactivity       Review of Systems     Review of Systems   Constitutional: Negative for activity change, appetite change, chills and fever  HENT: Negative for hearing loss and trouble swallowing  Eyes: Negative for visual disturbance  Respiratory: Negative for cough and chest tightness  Cardiovascular: Negative for chest pain, palpitations and leg swelling  Gastrointestinal: Negative for abdominal pain, blood in stool, constipation and diarrhea  Endocrine: Negative for cold intolerance and heat intolerance  Genitourinary: Negative for difficulty urinating, dysuria and hematuria  Musculoskeletal: Positive for gait problem  Negative for back pain  Skin: Negative for rash  Neurological: Negative for weakness  Psychiatric/Behavioral: Negative for agitation, behavioral problems, hallucinations, sleep disturbance and suicidal ideas  The patient is not nervous/anxious  Medications and orders     All medications reviewed and updated in Nursing Home EMR  Current medications:  Atorvastatin 80 po q h s    Buspirone 10 mg po twice a day  Diltiazem  mg 1 cap p o  q day  Eliquis 5 mg p o  twice daily  Fluoxetine 40 mg p o  q a m  Melatonin 3 mg p o  q h s  p r n  insomnia  Metoprolol XL 25 mg p o  nightly  Pantoprazole 40 mg p o  q a m  Acetaminophen 1000 mg p o  Q 8 hours      Objective     Vitals:   T: 97 5  P: 74  B/P: 150/82  RR:16  O2sat: 95%  weight: 238 lb    Physical Exam  Vitals reviewed  Constitutional:       Appearance: Normal appearance  He is not toxic-appearing or diaphoretic  HENT:      Head: Normocephalic and atraumatic  Right Ear: External ear normal       Left Ear: External ear normal       Nose: No congestion or rhinorrhea  Mouth/Throat:      Mouth: Mucous membranes are moist       Pharynx: No oropharyngeal exudate or posterior oropharyngeal erythema  Eyes:      General: No scleral icterus  Conjunctiva/sclera: Conjunctivae normal       Pupils: Pupils are equal, round, and reactive to light  Comments: Wearing glasses   Neck:      Comments: Trachea midline  No thyromegaly  Cardiovascular:      Rate and Rhythm: Normal rate and regular rhythm  Heart sounds: No murmur heard  Pulmonary:      Effort: Pulmonary effort is normal       Breath sounds: Normal breath sounds  Abdominal:      General: Bowel sounds are normal       Palpations: Abdomen is soft  Tenderness: There is no abdominal tenderness  There is no right CVA tenderness or left CVA tenderness  Genitourinary:     Comments: No suprapubic tenderness  Musculoskeletal:         General: Normal range of motion  Cervical back: Normal range of motion  Right lower leg: No edema  Left lower leg: No edema  Skin:     General: Skin is warm  Comments: No lesions on exposed skin  Multiple well-healed scars from previous surgeries   Neurological:      Mental Status: He is alert  Motor: Weakness present     Psychiatric:         Mood and Affect: Mood normal          Behavior: Behavior normal       Comments: PHQ-2 negative         Pertinent Laboratory/Diagnostic Studies: review  Labs/studies were reviewed please see chart or hospital paperwork for details  Labs(06/29/2022): Sodium 142, potassium 5 7, chloride 107, CO2 33, BUN 24, creatinine 1 24, eGFR 62(G2CKD) 1 24, glucose 103  WBC 7 2, hemoglobin 14, hematocrit 44, NCV 90 platelets [id number],   D7D:  6 4 on 06/25/2022  - Counseling Documentation: patient was counseled regarding: diagnostic results, instructions for management, risk factor reductions, risks and benefits of treatment options and importance of compliance with treatment     - Thank you for the opportunity to participate in the care of Mr Farrar      Xochitl Evans MD  Geriatric fellow   06/30/2022